# Patient Record
Sex: FEMALE
[De-identification: names, ages, dates, MRNs, and addresses within clinical notes are randomized per-mention and may not be internally consistent; named-entity substitution may affect disease eponyms.]

---

## 2017-06-08 PROBLEM — Z00.00 ENCOUNTER FOR PREVENTIVE HEALTH EXAMINATION: Status: ACTIVE | Noted: 2017-06-08

## 2017-06-30 ENCOUNTER — APPOINTMENT (OUTPATIENT)
Dept: ULTRASOUND IMAGING | Facility: IMAGING CENTER | Age: 32
End: 2017-06-30

## 2017-06-30 ENCOUNTER — OUTPATIENT (OUTPATIENT)
Dept: OUTPATIENT SERVICES | Facility: HOSPITAL | Age: 32
LOS: 1 days | End: 2017-06-30
Payer: COMMERCIAL

## 2017-06-30 DIAGNOSIS — Z00.8 ENCOUNTER FOR OTHER GENERAL EXAMINATION: ICD-10-CM

## 2017-06-30 PROCEDURE — 76641 ULTRASOUND BREAST COMPLETE: CPT

## 2017-08-14 ENCOUNTER — TRANSCRIPTION ENCOUNTER (OUTPATIENT)
Age: 32
End: 2017-08-14

## 2017-08-17 ENCOUNTER — TRANSCRIPTION ENCOUNTER (OUTPATIENT)
Age: 32
End: 2017-08-17

## 2017-08-21 ENCOUNTER — TRANSCRIPTION ENCOUNTER (OUTPATIENT)
Age: 32
End: 2017-08-21

## 2017-12-18 ENCOUNTER — RESULT REVIEW (OUTPATIENT)
Age: 32
End: 2017-12-18

## 2018-12-21 ENCOUNTER — EMERGENCY (EMERGENCY)
Facility: HOSPITAL | Age: 33
LOS: 1 days | Discharge: HOME | End: 2018-12-21
Attending: EMERGENCY MEDICINE
Payer: COMMERCIAL

## 2018-12-21 VITALS
SYSTOLIC BLOOD PRESSURE: 112 MMHG | WEIGHT: 139.99 LBS | TEMPERATURE: 98 F | RESPIRATION RATE: 18 BRPM | HEART RATE: 89 BPM | HEIGHT: 66 IN | DIASTOLIC BLOOD PRESSURE: 74 MMHG

## 2018-12-21 PROCEDURE — 99282 EMERGENCY DEPT VISIT SF MDM: CPT | Mod: 25

## 2018-12-21 PROCEDURE — 10060 I&D ABSCESS SIMPLE/SINGLE: CPT | Mod: LT

## 2018-12-21 PROCEDURE — 10060 I&D ABSCESS SIMPLE/SINGLE: CPT

## 2018-12-21 NOTE — ED PROVIDER NOTE - NSFOLLOWUPINSTRUCTIONS_ED_ALL_ED_FT
Follow up with your primary care doctor or dermatologist within 2 days for re-evaluate for need for re-drainage, return to the Emergency Department if they are not able to see you.  If you start running high fevers, have a large area of redness surrounding the abscess this could mean a spreading infection for which you should return to the Emergency Department.  Change the bandage over the abscess daily or as it becomes dirty.

## 2018-12-21 NOTE — ED PROVIDER NOTE - PHYSICAL EXAMINATION
Skin:  approx 1cm long by 0.25 cm wide superficial abscess with minimal surround cellulitis approx 1 cm proximal to L clavicle on neck

## 2018-12-21 NOTE — ED ADULT NURSE NOTE - OBJECTIVE STATEMENT
33 year old female presents ambulatory to ED through waiting room complaining of abscess to left side of neck, sent by dermatologist to be drained. Denies fevers, chills.

## 2018-12-21 NOTE — ED PROVIDER NOTE - OBJECTIVE STATEMENT
Patient presenting for evaluation of abscess on neck.  No fevers, no chills, some small drainage from site.  Otherwise healthy, no complaints.

## 2018-12-27 ENCOUNTER — RESULT REVIEW (OUTPATIENT)
Age: 33
End: 2018-12-27

## 2019-01-31 ENCOUNTER — APPOINTMENT (OUTPATIENT)
Dept: OBGYN | Facility: CLINIC | Age: 34
End: 2019-01-31
Payer: COMMERCIAL

## 2019-01-31 ENCOUNTER — NON-APPOINTMENT (OUTPATIENT)
Age: 34
End: 2019-01-31

## 2019-01-31 VITALS
SYSTOLIC BLOOD PRESSURE: 100 MMHG | DIASTOLIC BLOOD PRESSURE: 60 MMHG | HEIGHT: 67 IN | WEIGHT: 141.5 LBS | BODY MASS INDEX: 22.21 KG/M2

## 2019-01-31 PROCEDURE — 99203 OFFICE O/P NEW LOW 30 MIN: CPT

## 2019-02-04 LAB
ABO + RH PNL BLD: NORMAL
B19V IGG SER QL IA: 0.1 INDEX
B19V IGG+IGM SER-IMP: NEGATIVE
B19V IGG+IGM SER-IMP: NORMAL
B19V IGM FLD-ACNC: 0.2 INDEX
B19V IGM SER-ACNC: NEGATIVE
BACTERIA UR CULT: NORMAL
BASOPHILS # BLD AUTO: 0.02 K/UL
BASOPHILS NFR BLD AUTO: 0.3 %
BLD GP AB SCN SERPL QL: NORMAL
C TRACH RRNA SPEC QL NAA+PROBE: NOT DETECTED
CMV IGG SERPL QL: <0.2 U/ML
CMV IGG SERPL-IMP: NEGATIVE
CMV IGM SERPL QL: <8 AU/ML
CMV IGM SERPL QL: NEGATIVE
EOSINOPHIL # BLD AUTO: 0.05 K/UL
EOSINOPHIL NFR BLD AUTO: 0.7 %
HBA1C MFR BLD HPLC: 4.8 %
HBV SURFACE AG SER QL: NONREACTIVE
HCT VFR BLD CALC: 40.3 %
HCV AB SER QL: NONREACTIVE
HCV S/CO RATIO: 0.18 S/CO
HGB A MFR BLD: 97 %
HGB A2 MFR BLD: 2.6 %
HGB BLD-MCNC: 13.3 G/DL
HGB F MFR BLD: 0.4 %
HGB FRACT BLD-IMP: NORMAL
HIV1+2 AB SPEC QL IA.RAPID: NONREACTIVE
IMM GRANULOCYTES NFR BLD AUTO: 0.1 %
LEAD BLD-MCNC: <1 UG/DL
LYMPHOCYTES # BLD AUTO: 1.56 K/UL
LYMPHOCYTES NFR BLD AUTO: 21.9 %
MAN DIFF?: NORMAL
MCHC RBC-ENTMCNC: 31.5 PG
MCHC RBC-ENTMCNC: 33 GM/DL
MCV RBC AUTO: 95.5 FL
MONOCYTES # BLD AUTO: 0.49 K/UL
MONOCYTES NFR BLD AUTO: 6.9 %
N GONORRHOEA RRNA SPEC QL NAA+PROBE: NOT DETECTED
NEUTROPHILS # BLD AUTO: 4.98 K/UL
NEUTROPHILS NFR BLD AUTO: 70.1 %
PLATELET # BLD AUTO: 254 K/UL
RBC # BLD: 4.22 M/UL
RBC # FLD: 12.2 %
RUBV IGG FLD-ACNC: 11.7 INDEX
RUBV IGG SER-IMP: POSITIVE
SOURCE AMPLIFICATION: NORMAL
T PALLIDUM AB SER QL IA: NEGATIVE
TSH SERPL-ACNC: 0.66 UIU/ML
VZV AB TITR SER: POSITIVE
VZV IGG SER IF-ACNC: 846.6 INDEX
WBC # FLD AUTO: 7.11 K/UL

## 2019-02-09 LAB — FMR1 GENE MUT ANL BLD/T: NORMAL

## 2019-02-11 ENCOUNTER — OTHER (OUTPATIENT)
Age: 34
End: 2019-02-11

## 2019-02-11 ENCOUNTER — TRANSCRIPTION ENCOUNTER (OUTPATIENT)
Age: 34
End: 2019-02-11

## 2019-02-14 LAB — CFTR MUT TESTED BLD/T: NEGATIVE

## 2019-02-15 LAB
AR GENE MUT ANL BLD/T: NEGATIVE
GENE DIS ANL CARRIER INTERP-IMP: NEGATIVE

## 2019-02-25 ENCOUNTER — RESULT REVIEW (OUTPATIENT)
Age: 34
End: 2019-02-25

## 2019-02-25 LAB
CLARIM 15Q11.2: NORMAL
CLARIM 1P36: NORMAL
CLARIM 22Q11.2: NORMAL
CLARIM 4P-/WOLF-HIRSCHHORN: NORMAL
CLARIM 5P-/CRI DU CHAT: NORMAL
CLARIM ADDITIONAL INFO: NORMAL
CLARIM CHROMOSOME 13: NORMAL
CLARIM CHROMOSOME 18: NORMAL
CLARIM CHROMOSOME 21: NORMAL
CLARIM SEX CHROMOSOMES: NORMAL
CLARITEST NIPT W/MICRO: NORMAL

## 2019-02-27 ENCOUNTER — LABORATORY RESULT (OUTPATIENT)
Age: 34
End: 2019-02-27

## 2019-02-27 ENCOUNTER — APPOINTMENT (OUTPATIENT)
Dept: ANTEPARTUM | Facility: CLINIC | Age: 34
End: 2019-02-27
Payer: COMMERCIAL

## 2019-02-27 ENCOUNTER — ASOB RESULT (OUTPATIENT)
Age: 34
End: 2019-02-27

## 2019-02-27 PROCEDURE — 76813 OB US NUCHAL MEAS 1 GEST: CPT

## 2019-02-27 PROCEDURE — 36416 COLLJ CAPILLARY BLOOD SPEC: CPT

## 2019-02-27 PROCEDURE — 76801 OB US < 14 WKS SINGLE FETUS: CPT

## 2019-03-01 ENCOUNTER — TRANSCRIPTION ENCOUNTER (OUTPATIENT)
Age: 34
End: 2019-03-01

## 2019-03-07 ENCOUNTER — CHART COPY (OUTPATIENT)
Age: 34
End: 2019-03-07

## 2019-03-08 ENCOUNTER — NON-APPOINTMENT (OUTPATIENT)
Age: 34
End: 2019-03-08

## 2019-03-08 ENCOUNTER — APPOINTMENT (OUTPATIENT)
Dept: OBGYN | Facility: CLINIC | Age: 34
End: 2019-03-08
Payer: COMMERCIAL

## 2019-03-08 VITALS
SYSTOLIC BLOOD PRESSURE: 118 MMHG | WEIGHT: 144 LBS | HEIGHT: 67 IN | BODY MASS INDEX: 22.6 KG/M2 | DIASTOLIC BLOOD PRESSURE: 81 MMHG

## 2019-03-08 PROCEDURE — 0502F SUBSEQUENT PRENATAL CARE: CPT

## 2019-03-29 ENCOUNTER — APPOINTMENT (OUTPATIENT)
Dept: OBGYN | Facility: CLINIC | Age: 34
End: 2019-03-29

## 2019-03-29 VITALS
BODY MASS INDEX: 21.52 KG/M2 | HEIGHT: 67 IN | DIASTOLIC BLOOD PRESSURE: 71 MMHG | SYSTOLIC BLOOD PRESSURE: 111 MMHG | WEIGHT: 137.13 LBS

## 2019-03-29 VITALS
HEIGHT: 67 IN | WEIGHT: 144.38 LBS | DIASTOLIC BLOOD PRESSURE: 75 MMHG | SYSTOLIC BLOOD PRESSURE: 118 MMHG | BODY MASS INDEX: 22.66 KG/M2

## 2019-04-05 LAB
1ST TRIMESTER DATA: NORMAL
2ND TRIMESTER DATA: NORMAL
AFP PNL SERPL: NORMAL
AFP SERPL-ACNC: NORMAL
AFP SERPL-ACNC: NORMAL
B-HCG FREE SERPL-MCNC: NORMAL
CLINICAL BIOCHEMIST REVIEW: NORMAL
FREE BETA HCG 1ST TRIMESTER: NORMAL
INHIBIN A SERPL-MCNC: NORMAL
NASAL BONE: PRESENT
NOTES NTD: NORMAL
NT: NORMAL
PAPP-A SERPL-ACNC: NORMAL
U ESTRIOL SERPL-SCNC: NORMAL

## 2019-04-23 ENCOUNTER — ASOB RESULT (OUTPATIENT)
Age: 34
End: 2019-04-23

## 2019-04-23 ENCOUNTER — APPOINTMENT (OUTPATIENT)
Dept: ANTEPARTUM | Facility: CLINIC | Age: 34
End: 2019-04-23
Payer: COMMERCIAL

## 2019-04-23 PROCEDURE — 76811 OB US DETAILED SNGL FETUS: CPT

## 2019-04-23 PROCEDURE — 76817 TRANSVAGINAL US OBSTETRIC: CPT

## 2019-04-25 ENCOUNTER — APPOINTMENT (OUTPATIENT)
Dept: ANTEPARTUM | Facility: CLINIC | Age: 34
End: 2019-04-25

## 2019-04-30 ENCOUNTER — NON-APPOINTMENT (OUTPATIENT)
Age: 34
End: 2019-04-30

## 2019-04-30 ENCOUNTER — APPOINTMENT (OUTPATIENT)
Dept: OBGYN | Facility: CLINIC | Age: 34
End: 2019-04-30

## 2019-04-30 VITALS
DIASTOLIC BLOOD PRESSURE: 60 MMHG | SYSTOLIC BLOOD PRESSURE: 100 MMHG | WEIGHT: 149 LBS | HEIGHT: 67 IN | BODY MASS INDEX: 23.39 KG/M2

## 2019-04-30 DIAGNOSIS — Z34.01 ENCOUNTER FOR SUPERVISION OF NORMAL FIRST PREGNANCY, FIRST TRIMESTER: ICD-10-CM

## 2019-05-31 ENCOUNTER — NON-APPOINTMENT (OUTPATIENT)
Age: 34
End: 2019-05-31

## 2019-05-31 ENCOUNTER — APPOINTMENT (OUTPATIENT)
Dept: OBGYN | Facility: CLINIC | Age: 34
End: 2019-05-31
Payer: COMMERCIAL

## 2019-05-31 VITALS
SYSTOLIC BLOOD PRESSURE: 120 MMHG | BODY MASS INDEX: 23.26 KG/M2 | WEIGHT: 148.2 LBS | DIASTOLIC BLOOD PRESSURE: 72 MMHG | HEIGHT: 67 IN

## 2019-05-31 LAB
BILIRUB UR QL STRIP: NORMAL
CLARITY UR: CLEAR
COLLECTION METHOD: NORMAL
GLUCOSE UR-MCNC: NORMAL
HCG UR QL: 1 EU/DL
HGB UR QL STRIP.AUTO: NORMAL
KETONES UR-MCNC: NORMAL
LEUKOCYTE ESTERASE UR QL STRIP: NORMAL
NITRITE UR QL STRIP: NORMAL
PH UR STRIP: 7.5
PROT UR STRIP-MCNC: NORMAL
SP GR UR STRIP: 1.01

## 2019-05-31 PROCEDURE — 0502F SUBSEQUENT PRENATAL CARE: CPT

## 2019-06-10 ENCOUNTER — NON-APPOINTMENT (OUTPATIENT)
Age: 34
End: 2019-06-10

## 2019-06-10 LAB
BASOPHILS # BLD AUTO: 0.02 K/UL
BASOPHILS NFR BLD AUTO: 0.2 %
EOSINOPHIL # BLD AUTO: 0.02 K/UL
EOSINOPHIL NFR BLD AUTO: 0.2 %
GLUCOSE 1H P 50 G GLC PO SERPL-MCNC: 74 MG/DL
HCT VFR BLD CALC: 39.2 %
HGB BLD-MCNC: 12.5 G/DL
HIV1+2 AB SPEC QL IA.RAPID: NONREACTIVE
IMM GRANULOCYTES NFR BLD AUTO: 0.4 %
LYMPHOCYTES # BLD AUTO: 1.54 K/UL
LYMPHOCYTES NFR BLD AUTO: 16.7 %
MAN DIFF?: NORMAL
MCHC RBC-ENTMCNC: 31.9 GM/DL
MCHC RBC-ENTMCNC: 32 PG
MCV RBC AUTO: 100.3 FL
MEV IGG FLD QL IA: 224 AU/ML
MEV IGG+IGM SER-IMP: POSITIVE
MEV IGM SER QL: NEGATIVE
MONOCYTES # BLD AUTO: 0.55 K/UL
MONOCYTES NFR BLD AUTO: 6 %
NEUTROPHILS # BLD AUTO: 7.06 K/UL
NEUTROPHILS NFR BLD AUTO: 76.5 %
PLATELET # BLD AUTO: 231 K/UL
RBC # BLD: 3.91 M/UL
RBC # FLD: 13 %
WBC # FLD AUTO: 9.23 K/UL

## 2019-06-11 ENCOUNTER — APPOINTMENT (OUTPATIENT)
Dept: OBGYN | Facility: CLINIC | Age: 34
End: 2019-06-11

## 2019-06-13 ENCOUNTER — APPOINTMENT (OUTPATIENT)
Dept: OBGYN | Facility: CLINIC | Age: 34
End: 2019-06-13
Payer: COMMERCIAL

## 2019-06-13 VITALS
WEIGHT: 153 LBS | DIASTOLIC BLOOD PRESSURE: 72 MMHG | SYSTOLIC BLOOD PRESSURE: 110 MMHG | BODY MASS INDEX: 24.01 KG/M2 | HEIGHT: 67 IN

## 2019-06-13 PROCEDURE — 0502F SUBSEQUENT PRENATAL CARE: CPT

## 2019-07-02 ENCOUNTER — APPOINTMENT (OUTPATIENT)
Dept: OBGYN | Facility: CLINIC | Age: 34
End: 2019-07-02
Payer: COMMERCIAL

## 2019-07-02 ENCOUNTER — NON-APPOINTMENT (OUTPATIENT)
Age: 34
End: 2019-07-02

## 2019-07-02 VITALS
BODY MASS INDEX: 24.33 KG/M2 | WEIGHT: 155 LBS | HEIGHT: 67 IN | SYSTOLIC BLOOD PRESSURE: 111 MMHG | DIASTOLIC BLOOD PRESSURE: 74 MMHG

## 2019-07-02 PROCEDURE — 0502F SUBSEQUENT PRENATAL CARE: CPT

## 2019-07-13 ENCOUNTER — NON-APPOINTMENT (OUTPATIENT)
Age: 34
End: 2019-07-13

## 2019-07-23 ENCOUNTER — NON-APPOINTMENT (OUTPATIENT)
Age: 34
End: 2019-07-23

## 2019-07-23 ENCOUNTER — APPOINTMENT (OUTPATIENT)
Dept: OBGYN | Facility: CLINIC | Age: 34
End: 2019-07-23
Payer: COMMERCIAL

## 2019-07-23 VITALS
SYSTOLIC BLOOD PRESSURE: 120 MMHG | HEIGHT: 67 IN | DIASTOLIC BLOOD PRESSURE: 65 MMHG | WEIGHT: 158.25 LBS | BODY MASS INDEX: 24.84 KG/M2

## 2019-07-23 PROCEDURE — 0502F SUBSEQUENT PRENATAL CARE: CPT

## 2019-08-08 ENCOUNTER — APPOINTMENT (OUTPATIENT)
Dept: OBGYN | Facility: CLINIC | Age: 34
End: 2019-08-08
Payer: COMMERCIAL

## 2019-08-08 VITALS — BODY MASS INDEX: 25.06 KG/M2 | DIASTOLIC BLOOD PRESSURE: 70 MMHG | WEIGHT: 160 LBS | SYSTOLIC BLOOD PRESSURE: 104 MMHG

## 2019-08-08 DIAGNOSIS — Z34.93 ENCOUNTER FOR SUPERVISION OF NORMAL PREGNANCY, UNSPECIFIED, THIRD TRIMESTER: ICD-10-CM

## 2019-08-08 PROCEDURE — 0502F SUBSEQUENT PRENATAL CARE: CPT

## 2019-08-12 LAB
GP B STREP DNA SPEC QL NAA+PROBE: NORMAL
GP B STREP DNA SPEC QL NAA+PROBE: NOT DETECTED
SOURCE GBS: NORMAL

## 2019-08-13 ENCOUNTER — ASOB RESULT (OUTPATIENT)
Age: 34
End: 2019-08-13

## 2019-08-13 ENCOUNTER — APPOINTMENT (OUTPATIENT)
Dept: ANTEPARTUM | Facility: CLINIC | Age: 34
End: 2019-08-13
Payer: COMMERCIAL

## 2019-08-13 PROCEDURE — 76816 OB US FOLLOW-UP PER FETUS: CPT

## 2019-08-16 ENCOUNTER — APPOINTMENT (OUTPATIENT)
Dept: OBGYN | Facility: CLINIC | Age: 34
End: 2019-08-16
Payer: COMMERCIAL

## 2019-08-16 VITALS — HEIGHT: 67 IN | DIASTOLIC BLOOD PRESSURE: 82 MMHG | SYSTOLIC BLOOD PRESSURE: 121 MMHG

## 2019-08-16 PROCEDURE — 99080 SPECIAL REPORTS OR FORMS: CPT

## 2019-08-16 PROCEDURE — 0502F SUBSEQUENT PRENATAL CARE: CPT

## 2019-08-23 ENCOUNTER — APPOINTMENT (OUTPATIENT)
Dept: PEDIATRICS | Facility: CLINIC | Age: 34
End: 2019-08-23
Payer: COMMERCIAL

## 2019-08-23 ENCOUNTER — NON-APPOINTMENT (OUTPATIENT)
Age: 34
End: 2019-08-23

## 2019-08-23 ENCOUNTER — APPOINTMENT (OUTPATIENT)
Dept: OBGYN | Facility: CLINIC | Age: 34
End: 2019-08-23

## 2019-08-23 ENCOUNTER — APPOINTMENT (OUTPATIENT)
Dept: OBGYN | Facility: CLINIC | Age: 34
End: 2019-08-23
Payer: COMMERCIAL

## 2019-08-23 VITALS
SYSTOLIC BLOOD PRESSURE: 100 MMHG | BODY MASS INDEX: 25.58 KG/M2 | WEIGHT: 163 LBS | HEIGHT: 67 IN | DIASTOLIC BLOOD PRESSURE: 50 MMHG

## 2019-08-23 PROCEDURE — 0502F SUBSEQUENT PRENATAL CARE: CPT

## 2019-08-23 PROCEDURE — 99203 OFFICE O/P NEW LOW 30 MIN: CPT

## 2019-08-30 ENCOUNTER — NON-APPOINTMENT (OUTPATIENT)
Age: 34
End: 2019-08-30

## 2019-08-30 ENCOUNTER — APPOINTMENT (OUTPATIENT)
Dept: OBGYN | Facility: CLINIC | Age: 34
End: 2019-08-30
Payer: COMMERCIAL

## 2019-08-30 VITALS
BODY MASS INDEX: 26.06 KG/M2 | SYSTOLIC BLOOD PRESSURE: 108 MMHG | HEIGHT: 67 IN | WEIGHT: 166 LBS | DIASTOLIC BLOOD PRESSURE: 70 MMHG

## 2019-08-30 DIAGNOSIS — Z34.03 ENCOUNTER FOR SUPERVISION OF NORMAL FIRST PREGNANCY, THIRD TRIMESTER: ICD-10-CM

## 2019-08-30 PROCEDURE — 0502F SUBSEQUENT PRENATAL CARE: CPT

## 2019-09-05 ENCOUNTER — APPOINTMENT (OUTPATIENT)
Dept: OBGYN | Facility: CLINIC | Age: 34
End: 2019-09-05
Payer: COMMERCIAL

## 2019-09-05 ENCOUNTER — NON-APPOINTMENT (OUTPATIENT)
Age: 34
End: 2019-09-05

## 2019-09-05 VITALS
DIASTOLIC BLOOD PRESSURE: 80 MMHG | HEIGHT: 67 IN | WEIGHT: 167 LBS | SYSTOLIC BLOOD PRESSURE: 124 MMHG | BODY MASS INDEX: 26.21 KG/M2

## 2019-09-05 PROCEDURE — 0502F SUBSEQUENT PRENATAL CARE: CPT

## 2019-09-07 ENCOUNTER — INPATIENT (INPATIENT)
Facility: HOSPITAL | Age: 34
LOS: 1 days | Discharge: HOME | End: 2019-09-09
Attending: OBSTETRICS & GYNECOLOGY | Admitting: OBSTETRICS & GYNECOLOGY
Payer: COMMERCIAL

## 2019-09-07 VITALS
OXYGEN SATURATION: 100 % | SYSTOLIC BLOOD PRESSURE: 128 MMHG | HEART RATE: 100 BPM | TEMPERATURE: 98 F | RESPIRATION RATE: 18 BRPM | DIASTOLIC BLOOD PRESSURE: 63 MMHG

## 2019-09-07 DIAGNOSIS — O26.899 OTHER SPECIFIED PREGNANCY RELATED CONDITIONS, UNSPECIFIED TRIMESTER: ICD-10-CM

## 2019-09-07 DIAGNOSIS — Z3A.00 WEEKS OF GESTATION OF PREGNANCY NOT SPECIFIED: ICD-10-CM

## 2019-09-07 DIAGNOSIS — Z34.80 ENCOUNTER FOR SUPERVISION OF OTHER NORMAL PREGNANCY, UNSPECIFIED TRIMESTER: ICD-10-CM

## 2019-09-07 LAB
ALBUMIN SERPL ELPH-MCNC: 3.5 G/DL — SIGNIFICANT CHANGE UP (ref 3.3–5)
ALP SERPL-CCNC: 111 U/L — SIGNIFICANT CHANGE UP (ref 40–120)
ALT FLD-CCNC: 17 U/L — SIGNIFICANT CHANGE UP (ref 10–45)
ANION GAP SERPL CALC-SCNC: 11 MMOL/L — SIGNIFICANT CHANGE UP (ref 5–17)
APPEARANCE UR: ABNORMAL
APTT BLD: 26.8 SEC — LOW (ref 27.5–36.3)
AST SERPL-CCNC: 19 U/L — SIGNIFICANT CHANGE UP (ref 10–40)
BACTERIA # UR AUTO: NEGATIVE — SIGNIFICANT CHANGE UP
BASOPHILS # BLD AUTO: 0.1 K/UL — SIGNIFICANT CHANGE UP (ref 0–0.2)
BASOPHILS # BLD AUTO: 0.1 K/UL — SIGNIFICANT CHANGE UP (ref 0–0.2)
BASOPHILS NFR BLD AUTO: 0.4 % — SIGNIFICANT CHANGE UP (ref 0–2)
BASOPHILS NFR BLD AUTO: 0.5 % — SIGNIFICANT CHANGE UP (ref 0–2)
BILIRUB SERPL-MCNC: 0.8 MG/DL — SIGNIFICANT CHANGE UP (ref 0.2–1.2)
BILIRUB UR-MCNC: NEGATIVE — SIGNIFICANT CHANGE UP
BLD GP AB SCN SERPL QL: NEGATIVE — SIGNIFICANT CHANGE UP
BUN SERPL-MCNC: 8 MG/DL — SIGNIFICANT CHANGE UP (ref 7–23)
CALCIUM SERPL-MCNC: 9.6 MG/DL — SIGNIFICANT CHANGE UP (ref 8.4–10.5)
CHLORIDE SERPL-SCNC: 104 MMOL/L — SIGNIFICANT CHANGE UP (ref 96–108)
CO2 SERPL-SCNC: 25 MMOL/L — SIGNIFICANT CHANGE UP (ref 22–31)
COLOR SPEC: SIGNIFICANT CHANGE UP
CREAT ?TM UR-MCNC: 42 MG/DL — SIGNIFICANT CHANGE UP
CREAT SERPL-MCNC: 0.63 MG/DL — SIGNIFICANT CHANGE UP (ref 0.5–1.3)
DIFF PNL FLD: ABNORMAL
EOSINOPHIL # BLD AUTO: 0.1 K/UL — SIGNIFICANT CHANGE UP (ref 0–0.5)
EOSINOPHIL # BLD AUTO: 0.1 K/UL — SIGNIFICANT CHANGE UP (ref 0–0.5)
EOSINOPHIL NFR BLD AUTO: 0.5 % — SIGNIFICANT CHANGE UP (ref 0–6)
EOSINOPHIL NFR BLD AUTO: 0.6 % — SIGNIFICANT CHANGE UP (ref 0–6)
EPI CELLS # UR: 2 /HPF — SIGNIFICANT CHANGE UP (ref 0–5)
FIBRINOGEN PPP-MCNC: 614 MG/DL — HIGH (ref 350–510)
GLUCOSE SERPL-MCNC: 82 MG/DL — SIGNIFICANT CHANGE UP (ref 70–99)
GLUCOSE UR QL: NEGATIVE — SIGNIFICANT CHANGE UP
HCT VFR BLD CALC: 37.6 % — SIGNIFICANT CHANGE UP (ref 34.5–45)
HCT VFR BLD CALC: 37.8 % — SIGNIFICANT CHANGE UP (ref 34.5–45)
HGB BLD-MCNC: 12.5 G/DL — SIGNIFICANT CHANGE UP (ref 11.5–15.5)
HGB BLD-MCNC: 12.5 G/DL — SIGNIFICANT CHANGE UP (ref 11.5–15.5)
HYALINE CASTS # UR AUTO: 10 /LPF — HIGH (ref 0–7)
INR BLD: 0.97 RATIO — SIGNIFICANT CHANGE UP (ref 0.88–1.16)
KETONES UR-MCNC: NEGATIVE — SIGNIFICANT CHANGE UP
LDH SERPL L TO P-CCNC: 217 U/L — SIGNIFICANT CHANGE UP (ref 50–242)
LEUKOCYTE ESTERASE UR-ACNC: NEGATIVE — SIGNIFICANT CHANGE UP
LYMPHOCYTES # BLD AUTO: 16.4 % — SIGNIFICANT CHANGE UP (ref 13–44)
LYMPHOCYTES # BLD AUTO: 17.1 % — SIGNIFICANT CHANGE UP (ref 13–44)
LYMPHOCYTES # BLD AUTO: 2.2 K/UL — SIGNIFICANT CHANGE UP (ref 1–3.3)
LYMPHOCYTES # BLD AUTO: 2.2 K/UL — SIGNIFICANT CHANGE UP (ref 1–3.3)
MCHC RBC-ENTMCNC: 32.2 PG — SIGNIFICANT CHANGE UP (ref 27–34)
MCHC RBC-ENTMCNC: 32.7 PG — SIGNIFICANT CHANGE UP (ref 27–34)
MCHC RBC-ENTMCNC: 33 GM/DL — SIGNIFICANT CHANGE UP (ref 32–36)
MCHC RBC-ENTMCNC: 33.2 GM/DL — SIGNIFICANT CHANGE UP (ref 32–36)
MCV RBC AUTO: 97.7 FL — SIGNIFICANT CHANGE UP (ref 80–100)
MCV RBC AUTO: 98.5 FL — SIGNIFICANT CHANGE UP (ref 80–100)
MONOCYTES # BLD AUTO: 0.7 K/UL — SIGNIFICANT CHANGE UP (ref 0–0.9)
MONOCYTES # BLD AUTO: 0.9 K/UL — SIGNIFICANT CHANGE UP (ref 0–0.9)
MONOCYTES NFR BLD AUTO: 5.8 % — SIGNIFICANT CHANGE UP (ref 2–14)
MONOCYTES NFR BLD AUTO: 6.9 % — SIGNIFICANT CHANGE UP (ref 2–14)
NEUTROPHILS # BLD AUTO: 10.4 K/UL — HIGH (ref 1.8–7.4)
NEUTROPHILS # BLD AUTO: 9.8 K/UL — HIGH (ref 1.8–7.4)
NEUTROPHILS NFR BLD AUTO: 75.9 % — SIGNIFICANT CHANGE UP (ref 43–77)
NEUTROPHILS NFR BLD AUTO: 76.1 % — SIGNIFICANT CHANGE UP (ref 43–77)
NITRITE UR-MCNC: NEGATIVE — SIGNIFICANT CHANGE UP
PH UR: 7.5 — SIGNIFICANT CHANGE UP (ref 5–8)
PLATELET # BLD AUTO: 210 K/UL — SIGNIFICANT CHANGE UP (ref 150–400)
PLATELET # BLD AUTO: 232 K/UL — SIGNIFICANT CHANGE UP (ref 150–400)
POTASSIUM SERPL-MCNC: 4.4 MMOL/L — SIGNIFICANT CHANGE UP (ref 3.5–5.3)
POTASSIUM SERPL-SCNC: 4.4 MMOL/L — SIGNIFICANT CHANGE UP (ref 3.5–5.3)
PROT ?TM UR-MCNC: 41 MG/DL — HIGH (ref 0–12)
PROT SERPL-MCNC: 6.4 G/DL — SIGNIFICANT CHANGE UP (ref 6–8.3)
PROT UR-MCNC: ABNORMAL
PROT/CREAT UR-RTO: 1 RATIO — HIGH (ref 0–0.2)
PROTHROM AB SERPL-ACNC: 11 SEC — SIGNIFICANT CHANGE UP (ref 10–12.9)
RBC # BLD: 3.82 M/UL — SIGNIFICANT CHANGE UP (ref 3.8–5.2)
RBC # BLD: 3.87 M/UL — SIGNIFICANT CHANGE UP (ref 3.8–5.2)
RBC # FLD: 11.2 % — SIGNIFICANT CHANGE UP (ref 10.3–14.5)
RBC # FLD: 11.3 % — SIGNIFICANT CHANGE UP (ref 10.3–14.5)
RBC CASTS # UR COMP ASSIST: 236 /HPF — HIGH (ref 0–4)
RH IG SCN BLD-IMP: POSITIVE — SIGNIFICANT CHANGE UP
RH IG SCN BLD-IMP: POSITIVE — SIGNIFICANT CHANGE UP
SODIUM SERPL-SCNC: 140 MMOL/L — SIGNIFICANT CHANGE UP (ref 135–145)
SP GR SPEC: 1.01 — SIGNIFICANT CHANGE UP (ref 1.01–1.02)
URATE SERPL-MCNC: 5 MG/DL — SIGNIFICANT CHANGE UP (ref 2.5–7)
UROBILINOGEN FLD QL: SIGNIFICANT CHANGE UP
WBC # BLD: 12.9 K/UL — HIGH (ref 3.8–10.5)
WBC # BLD: 13.8 K/UL — HIGH (ref 3.8–10.5)
WBC # FLD AUTO: 12.9 K/UL — HIGH (ref 3.8–10.5)
WBC # FLD AUTO: 13.8 K/UL — HIGH (ref 3.8–10.5)
WBC UR QL: 3 /HPF — SIGNIFICANT CHANGE UP (ref 0–5)

## 2019-09-07 PROCEDURE — 59400 OBSTETRICAL CARE: CPT

## 2019-09-07 RX ORDER — HYDROCORTISONE 1 %
1 OINTMENT (GRAM) TOPICAL EVERY 6 HOURS
Refills: 0 | Status: DISCONTINUED | OUTPATIENT
Start: 2019-09-07 | End: 2019-09-09

## 2019-09-07 RX ORDER — GLYCERIN ADULT
1 SUPPOSITORY, RECTAL RECTAL AT BEDTIME
Refills: 0 | Status: DISCONTINUED | OUTPATIENT
Start: 2019-09-07 | End: 2019-09-09

## 2019-09-07 RX ORDER — BENZOCAINE 10 %
1 GEL (GRAM) MUCOUS MEMBRANE EVERY 6 HOURS
Refills: 0 | Status: DISCONTINUED | OUTPATIENT
Start: 2019-09-07 | End: 2019-09-09

## 2019-09-07 RX ORDER — PRAMOXINE HYDROCHLORIDE 150 MG/15G
1 AEROSOL, FOAM RECTAL EVERY 4 HOURS
Refills: 0 | Status: DISCONTINUED | OUTPATIENT
Start: 2019-09-07 | End: 2019-09-09

## 2019-09-07 RX ORDER — SIMETHICONE 80 MG/1
80 TABLET, CHEWABLE ORAL EVERY 4 HOURS
Refills: 0 | Status: DISCONTINUED | OUTPATIENT
Start: 2019-09-07 | End: 2019-09-09

## 2019-09-07 RX ORDER — OXYCODONE HYDROCHLORIDE 5 MG/1
5 TABLET ORAL
Refills: 0 | Status: DISCONTINUED | OUTPATIENT
Start: 2019-09-07 | End: 2019-09-09

## 2019-09-07 RX ORDER — KETOROLAC TROMETHAMINE 30 MG/ML
30 SYRINGE (ML) INJECTION ONCE
Refills: 0 | Status: DISCONTINUED | OUTPATIENT
Start: 2019-09-07 | End: 2019-09-07

## 2019-09-07 RX ORDER — LANOLIN
1 OINTMENT (GRAM) TOPICAL EVERY 6 HOURS
Refills: 0 | Status: DISCONTINUED | OUTPATIENT
Start: 2019-09-07 | End: 2019-09-09

## 2019-09-07 RX ORDER — OXYTOCIN 10 UNIT/ML
333.33 VIAL (ML) INJECTION
Qty: 20 | Refills: 0 | Status: DISCONTINUED | OUTPATIENT
Start: 2019-09-07 | End: 2019-09-09

## 2019-09-07 RX ORDER — DOCUSATE SODIUM 100 MG
100 CAPSULE ORAL
Refills: 0 | Status: DISCONTINUED | OUTPATIENT
Start: 2019-09-07 | End: 2019-09-09

## 2019-09-07 RX ORDER — AER TRAVELER 0.5 G/1
1 SOLUTION RECTAL; TOPICAL EVERY 4 HOURS
Refills: 0 | Status: DISCONTINUED | OUTPATIENT
Start: 2019-09-07 | End: 2019-09-09

## 2019-09-07 RX ORDER — CITRIC ACID/SODIUM CITRATE 300-500 MG
15 SOLUTION, ORAL ORAL EVERY 6 HOURS
Refills: 0 | Status: DISCONTINUED | OUTPATIENT
Start: 2019-09-07 | End: 2019-09-08

## 2019-09-07 RX ORDER — TETANUS TOXOID, REDUCED DIPHTHERIA TOXOID AND ACELLULAR PERTUSSIS VACCINE, ADSORBED 5; 2.5; 8; 8; 2.5 [IU]/.5ML; [IU]/.5ML; UG/.5ML; UG/.5ML; UG/.5ML
0.5 SUSPENSION INTRAMUSCULAR ONCE
Refills: 0 | Status: DISCONTINUED | OUTPATIENT
Start: 2019-09-07 | End: 2019-09-09

## 2019-09-07 RX ORDER — DIBUCAINE 1 %
1 OINTMENT (GRAM) RECTAL EVERY 6 HOURS
Refills: 0 | Status: DISCONTINUED | OUTPATIENT
Start: 2019-09-07 | End: 2019-09-09

## 2019-09-07 RX ORDER — IBUPROFEN 200 MG
600 TABLET ORAL EVERY 6 HOURS
Refills: 0 | Status: COMPLETED | OUTPATIENT
Start: 2019-09-07 | End: 2020-08-05

## 2019-09-07 RX ORDER — DIPHENHYDRAMINE HCL 50 MG
25 CAPSULE ORAL EVERY 6 HOURS
Refills: 0 | Status: DISCONTINUED | OUTPATIENT
Start: 2019-09-07 | End: 2019-09-09

## 2019-09-07 RX ORDER — ACETAMINOPHEN 500 MG
975 TABLET ORAL
Refills: 0 | Status: DISCONTINUED | OUTPATIENT
Start: 2019-09-07 | End: 2019-09-09

## 2019-09-07 RX ORDER — SODIUM CHLORIDE 9 MG/ML
1000 INJECTION, SOLUTION INTRAVENOUS
Refills: 0 | Status: DISCONTINUED | OUTPATIENT
Start: 2019-09-07 | End: 2019-09-08

## 2019-09-07 RX ORDER — OXYTOCIN 10 UNIT/ML
2 VIAL (ML) INJECTION
Qty: 30 | Refills: 0 | Status: DISCONTINUED | OUTPATIENT
Start: 2019-09-07 | End: 2019-09-09

## 2019-09-07 RX ORDER — MAGNESIUM HYDROXIDE 400 MG/1
30 TABLET, CHEWABLE ORAL
Refills: 0 | Status: DISCONTINUED | OUTPATIENT
Start: 2019-09-07 | End: 2019-09-09

## 2019-09-07 RX ORDER — OXYCODONE HYDROCHLORIDE 5 MG/1
5 TABLET ORAL ONCE
Refills: 0 | Status: DISCONTINUED | OUTPATIENT
Start: 2019-09-07 | End: 2019-09-09

## 2019-09-07 RX ORDER — SODIUM CHLORIDE 9 MG/ML
3 INJECTION INTRAMUSCULAR; INTRAVENOUS; SUBCUTANEOUS EVERY 8 HOURS
Refills: 0 | Status: DISCONTINUED | OUTPATIENT
Start: 2019-09-07 | End: 2019-09-09

## 2019-09-07 RX ADMIN — Medication 2 MILLIUNIT(S)/MIN: at 12:15

## 2019-09-07 RX ADMIN — Medication 30 MILLIGRAM(S): at 23:46

## 2019-09-08 LAB
HCT VFR BLD CALC: 31.9 % — LOW (ref 34.5–45)
HGB BLD-MCNC: 10.7 G/DL — LOW (ref 11.5–15.5)
T PALLIDUM AB TITR SER: NEGATIVE — SIGNIFICANT CHANGE UP

## 2019-09-08 RX ORDER — IBUPROFEN 200 MG
600 TABLET ORAL EVERY 6 HOURS
Refills: 0 | Status: DISCONTINUED | OUTPATIENT
Start: 2019-09-08 | End: 2019-09-09

## 2019-09-08 RX ADMIN — Medication 975 MILLIGRAM(S): at 10:15

## 2019-09-08 RX ADMIN — Medication 600 MILLIGRAM(S): at 06:52

## 2019-09-08 RX ADMIN — Medication 600 MILLIGRAM(S): at 18:33

## 2019-09-08 RX ADMIN — Medication 600 MILLIGRAM(S): at 12:30

## 2019-09-08 RX ADMIN — Medication 600 MILLIGRAM(S): at 19:15

## 2019-09-08 RX ADMIN — Medication 975 MILLIGRAM(S): at 15:43

## 2019-09-08 RX ADMIN — Medication 600 MILLIGRAM(S): at 06:19

## 2019-09-08 RX ADMIN — Medication 975 MILLIGRAM(S): at 02:33

## 2019-09-08 RX ADMIN — Medication 1 TABLET(S): at 12:30

## 2019-09-08 RX ADMIN — Medication 600 MILLIGRAM(S): at 13:00

## 2019-09-08 RX ADMIN — Medication 975 MILLIGRAM(S): at 03:03

## 2019-09-08 RX ADMIN — Medication 975 MILLIGRAM(S): at 09:44

## 2019-09-08 RX ADMIN — SODIUM CHLORIDE 3 MILLILITER(S): 9 INJECTION INTRAMUSCULAR; INTRAVENOUS; SUBCUTANEOUS at 04:25

## 2019-09-08 RX ADMIN — Medication 975 MILLIGRAM(S): at 16:20

## 2019-09-08 NOTE — PROGRESS NOTE ADULT - SUBJECTIVE AND OBJECTIVE BOX
OB Progress Note:  PPD#1    S: 34yo PPD#1 s/p . Patient feels well. Pain is well controlled. She is tolerating a regular diet and passing flatus. She is voiding spontaneously, and ambulating without difficulty. Endorses light vaginal bleeding, soaking less than 1 pad/hour. Denies CP/SOB. Denies lightheadedness/dizziness. Denies N/V.     O:  Vitals:  Vital Signs Last 24 Hrs  T(C): 36.9 (08 Sep 2019 04:28), Max: 37.2 (08 Sep 2019 00:24)  T(F): 98.5 (08 Sep 2019 04:28), Max: 98.9 (08 Sep 2019 00:24)  HR: 69 (08 Sep 2019 04:) (69 - 100)  BP: 99/56 (08 Sep 2019 04:28) (99/56 - 133/67)  BP(mean): --  RR: 18 (08 Sep 2019 04:28) (16 - 18)  SpO2: 97% (08 Sep 2019 04:28) (96% - 100%)    MEDICATIONS  (STANDING):  acetaminophen   Tablet .. 975 milliGRAM(s) Oral <User Schedule>  diphtheria/tetanus/pertussis (acellular) Vaccine (ADAcel) 0.5 milliLiter(s) IntraMuscular once  ibuprofen  Tablet. 600 milliGRAM(s) Oral every 6 hours  oxytocin Infusion 333.333 milliUNIT(s)/Min (1000 mL/Hr) IV Continuous <Continuous>  oxytocin Infusion 333.333 milliUNIT(s)/Min (1000 mL/Hr) IV Continuous <Continuous>  oxytocin Infusion 2 milliUNIT(s)/Min (2 mL/Hr) IV Continuous <Continuous>  prenatal multivitamin 1 Tablet(s) Oral daily  sodium chloride 0.9% lock flush 3 milliLiter(s) IV Push every 8 hours      Labs:  Blood type: A Positive  Rubella IgG: RPR: Negative                          12.5   13.8<H> >-----------< 210    (  @ 18:10 )             37.6                        12.5   12.9<H> >-----------< 232    (  @ 06:37 )             37.8    19 @ 18:10      140  |  104  |  8   ----------------------------<  82  4.4   |  25  |  0.63        Ca    9.6      07 Sep 2019 18:10    TPro  6.4  /  Alb  3.5  /  TBili  0.8  /  DBili  x   /  AST  19  /  ALT  17  /  AlkPhos  111  19 @ 18:10          Physical Exam:  General: NAD  Heart: all extremities well perfused  Lungs: breathing comfortably  Abdomen: soft, non-tender, non-distended, fundus firm  Vaginal: lochia wnl  Extremities: No calf tenderness or erythema
 PPD#1- ATTENDING NOTE  YANELIS GOMEZ  MRN-[id number]  33y    Patient is a 33y old  Female s/P  PPD#1      S: Patient doing well. Minimal lochia. Pain controlled.no HA, no N/V, no RUQ pain     O: Vital Signs Last 24 Hrs  T(C): 36.8 (08 Sep 2019 09:25), Max: 37.2 (08 Sep 2019 00:24)  T(F): 98.3 (08 Sep 2019 09:25), Max: 98.9 (08 Sep 2019 00:24)  HR: 86 (08 Sep 2019 09:25) (69 - 100)  BP: 120/87 (08 Sep 2019 09:25) (99/56 - 133/67)  BP(mean): --  RR: 18 (08 Sep 2019 09:25) (16 - 18)  SpO2: 97% (08 Sep 2019 04:28) (96% - 100%)    Gen: NAD  Abd: soft, NT, ND, fundus firm below umbilicus  Lochia: moderate  Ext: no tenderness, no hyper reflexia    Labs:                        10.7   x     )-----------( x        ( 08 Sep 2019 08:50 )             31.9       A: 33y PPD#1 s/p  doing well.  Gestational Hypertension during labor  BP wellcontrolled, not on antihypertensives.     Plan:  Analgesia prn, Motrin and Tylenol  Regular diet  continue routine postpartum care     Julia Boateng MD  Office Number [phone]

## 2019-09-08 NOTE — PROGRESS NOTE ADULT - PROBLEM SELECTOR PLAN 1
- Pain well controlled, continue current pain regimen  - Continue ambulation, SCDs when not ambulating  - Continue regular diet  - f/u AM H/H    Enrike Ortega, PGY-1

## 2019-09-09 ENCOUNTER — TRANSCRIPTION ENCOUNTER (OUTPATIENT)
Age: 34
End: 2019-09-09

## 2019-09-09 ENCOUNTER — APPOINTMENT (OUTPATIENT)
Dept: ANTEPARTUM | Facility: CLINIC | Age: 34
End: 2019-09-09

## 2019-09-09 VITALS
DIASTOLIC BLOOD PRESSURE: 72 MMHG | HEART RATE: 72 BPM | SYSTOLIC BLOOD PRESSURE: 103 MMHG | TEMPERATURE: 98 F | RESPIRATION RATE: 18 BRPM | OXYGEN SATURATION: 98 %

## 2019-09-09 PROCEDURE — 83615 LACTATE (LD) (LDH) ENZYME: CPT

## 2019-09-09 PROCEDURE — 84550 ASSAY OF BLOOD/URIC ACID: CPT

## 2019-09-09 PROCEDURE — G0463: CPT

## 2019-09-09 PROCEDURE — 85610 PROTHROMBIN TIME: CPT

## 2019-09-09 PROCEDURE — 86780 TREPONEMA PALLIDUM: CPT

## 2019-09-09 PROCEDURE — 59025 FETAL NON-STRESS TEST: CPT

## 2019-09-09 PROCEDURE — 86900 BLOOD TYPING SEROLOGIC ABO: CPT

## 2019-09-09 PROCEDURE — 85730 THROMBOPLASTIN TIME PARTIAL: CPT

## 2019-09-09 PROCEDURE — 81001 URINALYSIS AUTO W/SCOPE: CPT

## 2019-09-09 PROCEDURE — 85014 HEMATOCRIT: CPT

## 2019-09-09 PROCEDURE — 80053 COMPREHEN METABOLIC PANEL: CPT

## 2019-09-09 PROCEDURE — 86901 BLOOD TYPING SEROLOGIC RH(D): CPT

## 2019-09-09 PROCEDURE — 85384 FIBRINOGEN ACTIVITY: CPT

## 2019-09-09 PROCEDURE — 85018 HEMOGLOBIN: CPT

## 2019-09-09 PROCEDURE — 85027 COMPLETE CBC AUTOMATED: CPT

## 2019-09-09 PROCEDURE — 86850 RBC ANTIBODY SCREEN: CPT

## 2019-09-09 PROCEDURE — 82570 ASSAY OF URINE CREATININE: CPT

## 2019-09-09 PROCEDURE — 59050 FETAL MONITOR W/REPORT: CPT

## 2019-09-09 PROCEDURE — 84156 ASSAY OF PROTEIN URINE: CPT

## 2019-09-09 RX ADMIN — Medication 600 MILLIGRAM(S): at 07:21

## 2019-09-09 RX ADMIN — Medication 600 MILLIGRAM(S): at 01:40

## 2019-09-09 RX ADMIN — Medication 600 MILLIGRAM(S): at 01:10

## 2019-09-09 RX ADMIN — Medication 600 MILLIGRAM(S): at 06:37

## 2019-09-09 NOTE — DISCHARGE NOTE OB - PATIENT PORTAL LINK FT
You can access the FollowMyHealth Patient Portal offered by [facility] by registering at the following website: [email or web address]. By joining [facility]’s FollowMyHealth portal, you will also be able to view your health information using other applications (apps) compatible with our system.

## 2019-09-09 NOTE — DISCHARGE NOTE OB - CARE PROVIDER_API CALL
Julia Boateng)  Obstetrics and Gynecology  [address], [address]  [place] [address]  Phone: [phone]  Fax: [phone]  Follow Up Time:

## 2019-09-09 NOTE — DISCHARGE NOTE OB - CARE PLAN
Principal Discharge DX:	 (normal spontaneous vaginal delivery)  Goal:	rest and recuperation  Assessment and plan of treatment:	office visit 2-3 weeks

## 2019-09-10 ENCOUNTER — APPOINTMENT (OUTPATIENT)
Dept: OBGYN | Facility: CLINIC | Age: 34
End: 2019-09-10

## 2019-10-18 ENCOUNTER — APPOINTMENT (OUTPATIENT)
Dept: OBGYN | Facility: CLINIC | Age: 34
End: 2019-10-18
Payer: COMMERCIAL

## 2019-10-18 VITALS
HEIGHT: 67 IN | DIASTOLIC BLOOD PRESSURE: 70 MMHG | SYSTOLIC BLOOD PRESSURE: 113 MMHG | BODY MASS INDEX: 22.6 KG/M2 | WEIGHT: 144 LBS

## 2019-10-18 PROCEDURE — 90471 IMMUNIZATION ADMIN: CPT

## 2019-10-18 PROCEDURE — 0503F POSTPARTUM CARE VISIT: CPT

## 2019-10-18 PROCEDURE — 90686 IIV4 VACC NO PRSV 0.5 ML IM: CPT

## 2019-10-18 RX ORDER — NORETHINDRONE ACETATE AND ETHINYL ESTRADIOL 1MG-20(21)
1-20 KIT ORAL
Qty: 90 | Refills: 3 | Status: ACTIVE | COMMUNITY
Start: 2019-10-18 | End: 1900-01-01

## 2019-11-07 ENCOUNTER — OTHER (OUTPATIENT)
Age: 34
End: 2019-11-07

## 2020-02-06 ENCOUNTER — TRANSCRIPTION ENCOUNTER (OUTPATIENT)
Age: 35
End: 2020-02-06

## 2020-04-26 ENCOUNTER — MESSAGE (OUTPATIENT)
Age: 35
End: 2020-04-26

## 2020-05-07 ENCOUNTER — APPOINTMENT (OUTPATIENT)
Dept: DISASTER EMERGENCY | Facility: CLINIC | Age: 35
End: 2020-05-07

## 2020-05-21 ENCOUNTER — APPOINTMENT (OUTPATIENT)
Dept: DISASTER EMERGENCY | Facility: CLINIC | Age: 35
End: 2020-05-21

## 2020-05-22 LAB
SARS-COV-2 IGG SERPL IA-ACNC: 0.1 INDEX
SARS-COV-2 IGG SERPL QL IA: NEGATIVE

## 2020-06-12 ENCOUNTER — TRANSCRIPTION ENCOUNTER (OUTPATIENT)
Age: 35
End: 2020-06-12

## 2020-07-30 RX ORDER — NORETHINDRONE 0.35 MG/1
0.35 TABLET ORAL
Qty: 28 | Refills: 1 | Status: ACTIVE | COMMUNITY
Start: 2019-11-07 | End: 1900-01-01

## 2020-09-04 ENCOUNTER — APPOINTMENT (OUTPATIENT)
Dept: OBGYN | Facility: CLINIC | Age: 35
End: 2020-09-04
Payer: COMMERCIAL

## 2020-09-04 VITALS
BODY MASS INDEX: 22.13 KG/M2 | SYSTOLIC BLOOD PRESSURE: 118 MMHG | HEIGHT: 67 IN | WEIGHT: 141 LBS | DIASTOLIC BLOOD PRESSURE: 70 MMHG

## 2020-09-04 DIAGNOSIS — Z01.419 ENCOUNTER FOR GYNECOLOGICAL EXAMINATION (GENERAL) (ROUTINE) W/OUT ABNORMAL FINDINGS: ICD-10-CM

## 2020-09-04 DIAGNOSIS — Z30.9 ENCOUNTER FOR CONTRACEPTIVE MANAGEMENT, UNSPECIFIED: ICD-10-CM

## 2020-09-04 PROCEDURE — 99395 PREV VISIT EST AGE 18-39: CPT

## 2020-09-04 RX ORDER — ETONOGESTREL AND ETHINYL ESTRADIOL 11.7; 2.7 MG/1; MG/1
0.12-0.015 INSERT, EXTENDED RELEASE VAGINAL
Qty: 3 | Refills: 3 | Status: ACTIVE | COMMUNITY
Start: 2020-09-04 | End: 1900-01-01

## 2020-09-04 RX ORDER — ETONOGESTREL AND ETHINYL ESTRADIOL 11.7; 2.7 MG/1; MG/1
0.12-0.015 INSERT, EXTENDED RELEASE VAGINAL
Qty: 1 | Refills: 0 | Status: ACTIVE | COMMUNITY
Start: 2020-09-04 | End: 1900-01-01

## 2020-09-10 LAB — CYTOLOGY CVX/VAG DOC THIN PREP: NORMAL

## 2020-11-17 ENCOUNTER — APPOINTMENT (OUTPATIENT)
Dept: HUMAN REPRODUCTION | Facility: CLINIC | Age: 35
End: 2020-11-17

## 2020-12-23 PROBLEM — Z01.419 ENCOUNTER FOR GYNECOLOGICAL EXAMINATION WITH PAPANICOLAOU SMEAR OF CERVIX: Status: RESOLVED | Noted: 2020-09-04 | Resolved: 2020-12-23

## 2021-04-22 ENCOUNTER — RX RENEWAL (OUTPATIENT)
Age: 36
End: 2021-04-22

## 2021-09-15 ENCOUNTER — TRANSCRIPTION ENCOUNTER (OUTPATIENT)
Age: 36
End: 2021-09-15

## 2021-09-28 ENCOUNTER — APPOINTMENT (OUTPATIENT)
Dept: OBGYN | Facility: CLINIC | Age: 36
End: 2021-09-28
Payer: COMMERCIAL

## 2021-09-28 VITALS
DIASTOLIC BLOOD PRESSURE: 70 MMHG | BODY MASS INDEX: 22.62 KG/M2 | SYSTOLIC BLOOD PRESSURE: 110 MMHG | WEIGHT: 144.13 LBS | HEIGHT: 67 IN

## 2021-09-28 DIAGNOSIS — Z31.69 ENCOUNTER FOR OTHER GENERAL COUNSELING AND ADVICE ON PROCREATION: ICD-10-CM

## 2021-09-28 DIAGNOSIS — Z01.419 ENCOUNTER FOR GYNECOLOGICAL EXAMINATION (GENERAL) (ROUTINE) W/OUT ABNORMAL FINDINGS: ICD-10-CM

## 2021-09-28 PROCEDURE — 99395 PREV VISIT EST AGE 18-39: CPT

## 2021-09-28 RX ORDER — ETONOGESTREL AND ETHINYL ESTRADIOL 11.7; 2.7 MG/1; MG/1
0.12-0.015 INSERT, EXTENDED RELEASE VAGINAL
Qty: 3 | Refills: 3 | Status: ACTIVE | COMMUNITY
Start: 2021-09-28 | End: 1900-01-01

## 2021-09-30 LAB — HPV HIGH+LOW RISK DNA PNL CVX: NOT DETECTED

## 2021-10-02 LAB — CYTOLOGY CVX/VAG DOC THIN PREP: NORMAL

## 2021-10-08 ENCOUNTER — APPOINTMENT (OUTPATIENT)
Dept: MAMMOGRAPHY | Facility: IMAGING CENTER | Age: 36
End: 2021-10-08

## 2021-10-18 ENCOUNTER — TRANSCRIPTION ENCOUNTER (OUTPATIENT)
Age: 36
End: 2021-10-18

## 2021-10-18 LAB — ANTI-MUELLERIAN HORMONE: 0.36 NG/ML

## 2022-05-31 ENCOUNTER — NON-APPOINTMENT (OUTPATIENT)
Age: 37
End: 2022-05-31

## 2022-07-20 NOTE — ED ADULT NURSE NOTE - CAS DISCH TRANSFER METHOD
Problem: Discharge Planning  Goal: Discharge to home or other facility with appropriate resources  7/20/2022 0155 by Chester Romo  Outcome: Progressing  Flowsheets (Taken 7/19/2022 2000)  Discharge to home or other facility with appropriate resources:   Identify barriers to discharge with patient and caregiver   Arrange for needed discharge resources and transportation as appropriate   Identify discharge learning needs (meds, wound care, etc)   Refer to discharge planning if patient needs post-hospital services based on physician order or complex needs related to functional status, cognitive ability or social support system  7/19/2022 1810 by Daquan De La Cruz RN  Outcome: Progressing     Problem: Safety - Adult  Goal: Free from fall injury  7/20/2022 0155 by Chester Romo  Outcome: Progressing  Flowsheets (Taken 7/19/2022 2000)  Free From Fall Injury: Instruct family/caregiver on patient safety  7/19/2022 1810 by Daquan De La Cruz RN  Outcome: Progressing     Problem: Chronic Conditions and Co-morbidities  Goal: Patient's chronic conditions and co-morbidity symptoms are monitored and maintained or improved  7/20/2022 0155 by Chesetr Romo  Outcome: Progressing  Flowsheets (Taken 7/19/2022 2000)  Care Plan - Patient's Chronic Conditions and Co-Morbidity Symptoms are Monitored and Maintained or Improved:   Monitor and assess patient's chronic conditions and comorbid symptoms for stability, deterioration, or improvement   Collaborate with multidisciplinary team to address chronic and comorbid conditions and prevent exacerbation or deterioration   Update acute care plan with appropriate goals if chronic or comorbid symptoms are exacerbated and prevent overall improvement and discharge  7/19/2022 1810 by Daquan De La Cruz RN  Outcome: Progressing     Problem: Skin/Tissue Integrity  Goal: Absence of new skin breakdown  Description: 1. Monitor for areas of redness and/or skin breakdown  2.   Assess vascular access sites hourly  3. Every 4-6 hours minimum:  Change oxygen saturation probe site  4. Every 4-6 hours:  If on nasal continuous positive airway pressure, respiratory therapy assess nares and determine need for appliance change or resting period.   7/20/2022 0155 by Pearl Rodriguez  Outcome: Progressing  7/19/2022 1810 by Jimenez Morataya RN  Outcome: Progressing     Problem: Respiratory - Adult  Goal: Achieves optimal ventilation and oxygenation  7/20/2022 0155 by Pearl Rodriguez  Outcome: Progressing  7/19/2022 1810 by Jimenez Morataya RN  Outcome: Progressing     Problem: Pain  Goal: Verbalizes/displays adequate comfort level or baseline comfort level  Outcome: Progressing  Flowsheets (Taken 7/20/2022 0000)  Verbalizes/displays adequate comfort level or baseline comfort level:   Encourage patient to monitor pain and request assistance   Assess pain using appropriate pain scale   Administer analgesics based on type and severity of pain and evaluate response   Implement non-pharmacological measures as appropriate and evaluate response     Problem: ABCDS Injury Assessment  Goal: Absence of physical injury  Outcome: Progressing Private car

## 2022-10-04 ENCOUNTER — APPOINTMENT (OUTPATIENT)
Dept: OBGYN | Facility: CLINIC | Age: 37
End: 2022-10-04

## 2022-10-04 VITALS
HEIGHT: 67 IN | DIASTOLIC BLOOD PRESSURE: 60 MMHG | SYSTOLIC BLOOD PRESSURE: 110 MMHG | BODY MASS INDEX: 23.09 KG/M2 | WEIGHT: 147.13 LBS

## 2022-10-04 DIAGNOSIS — Z32.01 ENCOUNTER FOR PREGNANCY TEST, RESULT POSITIVE: ICD-10-CM

## 2022-10-04 DIAGNOSIS — Z01.419 ENCOUNTER FOR GYNECOLOGICAL EXAMINATION (GENERAL) (ROUTINE) W/OUT ABNORMAL FINDINGS: ICD-10-CM

## 2022-10-04 PROCEDURE — 99214 OFFICE O/P EST MOD 30 MIN: CPT

## 2022-10-20 ENCOUNTER — APPOINTMENT (OUTPATIENT)
Dept: OBGYN | Facility: CLINIC | Age: 37
End: 2022-10-20

## 2022-10-20 DIAGNOSIS — Z13.79 ENCOUNTER FOR OTHER SCREENING FOR GENETIC AND CHROMOSOMAL ANOMALIES: ICD-10-CM

## 2022-10-20 DIAGNOSIS — Z34.91 ENCOUNTER FOR SUPERVISION OF NORMAL PREGNANCY, UNSPECIFIED, FIRST TRIMESTER: ICD-10-CM

## 2022-10-20 LAB
BASOPHILS # BLD AUTO: 0.04 K/UL
BASOPHILS NFR BLD AUTO: 0.5 %
EOSINOPHIL # BLD AUTO: 0.04 K/UL
EOSINOPHIL NFR BLD AUTO: 0.5 %
HCT VFR BLD CALC: 39.1 %
HGB BLD-MCNC: 12.9 G/DL
HIV1+2 AB SPEC QL IA.RAPID: NONREACTIVE
IMM GRANULOCYTES NFR BLD AUTO: 0.2 %
LYMPHOCYTES # BLD AUTO: 1.79 K/UL
LYMPHOCYTES NFR BLD AUTO: 21.8 %
MAN DIFF?: NORMAL
MCHC RBC-ENTMCNC: 31.4 PG
MCHC RBC-ENTMCNC: 33 GM/DL
MCV RBC AUTO: 95.1 FL
MONOCYTES # BLD AUTO: 0.45 K/UL
MONOCYTES NFR BLD AUTO: 5.5 %
NEUTROPHILS # BLD AUTO: 5.86 K/UL
NEUTROPHILS NFR BLD AUTO: 71.5 %
PLATELET # BLD AUTO: 258 K/UL
RBC # BLD: 4.11 M/UL
RBC # FLD: 12 %
WBC # FLD AUTO: 8.2 K/UL

## 2022-10-20 PROCEDURE — 76817 TRANSVAGINAL US OBSTETRIC: CPT

## 2022-10-20 PROCEDURE — 0501F PRENATAL FLOW SHEET: CPT

## 2022-10-21 LAB
CMV IGG SERPL QL: <0.2 U/ML
CMV IGG SERPL-IMP: NEGATIVE
CMV IGM SERPL QL: <8 AU/ML
CMV IGM SERPL QL: NEGATIVE
MEV IGG FLD QL IA: 219 AU/ML
MEV IGG+IGM SER-IMP: POSITIVE
RUBV IGG FLD-ACNC: 11.4 INDEX
RUBV IGG SER-IMP: POSITIVE
T PALLIDUM AB SER QL IA: NEGATIVE
TSH SERPL-ACNC: 1.02 UIU/ML
VZV AB TITR SER: POSITIVE
VZV IGG SER IF-ACNC: 1582 INDEX

## 2022-10-23 LAB
ABO + RH PNL BLD: NORMAL
BACTERIA UR CULT: NORMAL
BLD GP AB SCN SERPL QL: NORMAL
C TRACH RRNA SPEC QL NAA+PROBE: NOT DETECTED
HBV SURFACE AG SER QL: NONREACTIVE
HCV AB SER QL: NONREACTIVE
HCV S/CO RATIO: 0.15 S/CO
HGB A MFR BLD: 97 %
HGB A2 MFR BLD: 2.7 %
HGB F MFR BLD: 0.3 %
HGB FRACT BLD-IMP: NORMAL
LEAD BLD-MCNC: <1 UG/DL
N GONORRHOEA RRNA SPEC QL NAA+PROBE: NOT DETECTED
SOURCE AMPLIFICATION: NORMAL

## 2022-10-26 LAB — FMR1 GENE MUT ANL BLD/T: NORMAL

## 2022-10-27 ENCOUNTER — TRANSCRIPTION ENCOUNTER (OUTPATIENT)
Age: 37
End: 2022-10-27

## 2022-10-27 LAB
AR GENE MUT ANL BLD/T: NORMAL
B19V IGG SER QL IA: 0.82 INDEX
B19V IGG+IGM SER-IMP: NEGATIVE
B19V IGG+IGM SER-IMP: NORMAL
B19V IGM FLD-ACNC: 0.14 INDEX
B19V IGM SER-ACNC: NEGATIVE
CFTR MUT TESTED BLD/T: NEGATIVE
CLARI ADDITIONAL INFO: NORMAL
CLARI CHROMOSOME 13: NORMAL
CLARI CHROMOSOME 18: NORMAL
CLARI CHROMOSOME 21: NORMAL
CLARI SEX CHROMOSOMES: NORMAL
CLARI TEST COMMENT: NORMAL
CLARITEST NIPT: NORMAL
FETAL FRACT: NORMAL
GESTATION AGE: NORMAL
MATERNAL WEIGHT (LBS):: NORMAL
PLEASE INCLUDE GENDER RESULTS ON THIS REPORT:: NORMAL
TYPE OF PREGNANCY:: NORMAL

## 2022-10-30 ENCOUNTER — NON-APPOINTMENT (OUTPATIENT)
Age: 37
End: 2022-10-30

## 2022-11-01 ENCOUNTER — ASOB RESULT (OUTPATIENT)
Age: 37
End: 2022-11-01

## 2022-11-01 ENCOUNTER — APPOINTMENT (OUTPATIENT)
Dept: ANTEPARTUM | Facility: CLINIC | Age: 37
End: 2022-11-01

## 2022-11-01 PROCEDURE — 76801 OB US < 14 WKS SINGLE FETUS: CPT

## 2022-11-01 PROCEDURE — 76813 OB US NUCHAL MEAS 1 GEST: CPT | Mod: 59

## 2022-11-04 LAB — Lab: NORMAL

## 2022-11-17 ENCOUNTER — NON-APPOINTMENT (OUTPATIENT)
Age: 37
End: 2022-11-17

## 2022-11-17 ENCOUNTER — APPOINTMENT (OUTPATIENT)
Dept: OBGYN | Facility: CLINIC | Age: 37
End: 2022-11-17

## 2022-11-17 VITALS
SYSTOLIC BLOOD PRESSURE: 116 MMHG | DIASTOLIC BLOOD PRESSURE: 73 MMHG | BODY MASS INDEX: 23.86 KG/M2 | HEIGHT: 67 IN | WEIGHT: 152 LBS

## 2022-11-17 PROCEDURE — 0502F SUBSEQUENT PRENATAL CARE: CPT

## 2022-11-18 ENCOUNTER — APPOINTMENT (OUTPATIENT)
Dept: OBGYN | Facility: CLINIC | Age: 37
End: 2022-11-18

## 2022-12-06 ENCOUNTER — NON-APPOINTMENT (OUTPATIENT)
Age: 37
End: 2022-12-06

## 2022-12-14 ENCOUNTER — NON-APPOINTMENT (OUTPATIENT)
Age: 37
End: 2022-12-14

## 2022-12-16 ENCOUNTER — APPOINTMENT (OUTPATIENT)
Dept: OBGYN | Facility: CLINIC | Age: 37
End: 2022-12-16

## 2022-12-16 VITALS
SYSTOLIC BLOOD PRESSURE: 113 MMHG | DIASTOLIC BLOOD PRESSURE: 74 MMHG | HEIGHT: 67 IN | WEIGHT: 148 LBS | BODY MASS INDEX: 23.23 KG/M2

## 2022-12-16 DIAGNOSIS — Z34.02 ENCOUNTER FOR SUPERVISION OF NORMAL FIRST PREGNANCY, SECOND TRIMESTER: ICD-10-CM

## 2022-12-16 PROCEDURE — 0502F SUBSEQUENT PRENATAL CARE: CPT

## 2022-12-20 LAB
AFP MOM: 0.94
AFP VALUE: 43.7 NG/ML
ALPHA FETOPROTEIN SERUM COMMENT: NORMAL
ALPHA FETOPROTEIN SERUM INTERPRETATION: NORMAL
ALPHA FETOPROTEIN SERUM RESULTS: NORMAL
ALPHA FETOPROTEIN SERUM TEST RESULTS: NORMAL
GESTATIONAL AGE BASED ON: NORMAL
GESTATIONAL AGE ON COLLECTION DATE: 18.1 WEEKS
INSULIN DEP DIABETES: NO
MATERNAL AGE AT EDD AFP: 36.6 YR
MULTIPLE GESTATION: NO
OSBR RISK 1 IN: NORMAL
RACE: NORMAL
WEIGHT AFP: 148 LBS

## 2022-12-29 ENCOUNTER — APPOINTMENT (OUTPATIENT)
Dept: ANTEPARTUM | Facility: CLINIC | Age: 37
End: 2022-12-29

## 2022-12-29 ENCOUNTER — ASOB RESULT (OUTPATIENT)
Age: 37
End: 2022-12-29

## 2022-12-29 PROCEDURE — 76811 OB US DETAILED SNGL FETUS: CPT

## 2023-01-17 ENCOUNTER — APPOINTMENT (OUTPATIENT)
Dept: OBGYN | Facility: CLINIC | Age: 38
End: 2023-01-17
Payer: COMMERCIAL

## 2023-01-17 VITALS — SYSTOLIC BLOOD PRESSURE: 98 MMHG | WEIGHT: 157 LBS | BODY MASS INDEX: 24.59 KG/M2 | DIASTOLIC BLOOD PRESSURE: 50 MMHG

## 2023-01-17 DIAGNOSIS — Z34.92 ENCOUNTER FOR SUPERVISION OF NORMAL PREGNANCY, UNSPECIFIED, SECOND TRIMESTER: ICD-10-CM

## 2023-01-17 DIAGNOSIS — O44.40 LOW LYING PLACENTA NOS OR WITHOUT HEMORRHAGE, UNSPECIFIED TRIMESTER: ICD-10-CM

## 2023-01-17 PROCEDURE — 0502F SUBSEQUENT PRENATAL CARE: CPT

## 2023-02-13 ENCOUNTER — APPOINTMENT (OUTPATIENT)
Dept: OBGYN | Facility: CLINIC | Age: 38
End: 2023-02-13
Payer: COMMERCIAL

## 2023-02-13 VITALS
SYSTOLIC BLOOD PRESSURE: 122 MMHG | DIASTOLIC BLOOD PRESSURE: 68 MMHG | WEIGHT: 163 LBS | HEIGHT: 67 IN | BODY MASS INDEX: 25.58 KG/M2

## 2023-02-13 PROCEDURE — 0502F SUBSEQUENT PRENATAL CARE: CPT

## 2023-02-19 ENCOUNTER — TRANSCRIPTION ENCOUNTER (OUTPATIENT)
Age: 38
End: 2023-02-19

## 2023-02-19 LAB
BASOPHILS # BLD AUTO: 0.04 K/UL
BASOPHILS NFR BLD AUTO: 0.4 %
EOSINOPHIL # BLD AUTO: 0.05 K/UL
EOSINOPHIL NFR BLD AUTO: 0.5 %
GLUCOSE 1H P 50 G GLC PO SERPL-MCNC: 90 MG/DL
HCT VFR BLD CALC: 35.8 %
HGB BLD-MCNC: 11.8 G/DL
IMM GRANULOCYTES NFR BLD AUTO: 0.6 %
LYMPHOCYTES # BLD AUTO: 1.95 K/UL
LYMPHOCYTES NFR BLD AUTO: 18.4 %
MAN DIFF?: NORMAL
MCHC RBC-ENTMCNC: 32 PG
MCHC RBC-ENTMCNC: 33 GM/DL
MCV RBC AUTO: 97 FL
MONOCYTES # BLD AUTO: 0.76 K/UL
MONOCYTES NFR BLD AUTO: 7.2 %
NEUTROPHILS # BLD AUTO: 7.72 K/UL
NEUTROPHILS NFR BLD AUTO: 72.9 %
PLATELET # BLD AUTO: 306 K/UL
RBC # BLD: 3.69 M/UL
RBC # FLD: 13 %
T PALLIDUM AB SER QL IA: NEGATIVE
WBC # FLD AUTO: 10.58 K/UL

## 2023-02-23 ENCOUNTER — ASOB RESULT (OUTPATIENT)
Age: 38
End: 2023-02-23

## 2023-02-23 ENCOUNTER — APPOINTMENT (OUTPATIENT)
Dept: ANTEPARTUM | Facility: CLINIC | Age: 38
End: 2023-02-23
Payer: COMMERCIAL

## 2023-02-23 PROCEDURE — 76817 TRANSVAGINAL US OBSTETRIC: CPT

## 2023-02-23 PROCEDURE — 76816 OB US FOLLOW-UP PER FETUS: CPT

## 2023-02-27 ENCOUNTER — NON-APPOINTMENT (OUTPATIENT)
Age: 38
End: 2023-02-27

## 2023-03-14 ENCOUNTER — NON-APPOINTMENT (OUTPATIENT)
Age: 38
End: 2023-03-14

## 2023-03-23 ENCOUNTER — APPOINTMENT (OUTPATIENT)
Dept: ANTEPARTUM | Facility: CLINIC | Age: 38
End: 2023-03-23
Payer: COMMERCIAL

## 2023-03-23 ENCOUNTER — APPOINTMENT (OUTPATIENT)
Dept: OBGYN | Facility: CLINIC | Age: 38
End: 2023-03-23
Payer: COMMERCIAL

## 2023-03-23 ENCOUNTER — ASOB RESULT (OUTPATIENT)
Age: 38
End: 2023-03-23

## 2023-03-23 VITALS
WEIGHT: 169 LBS | HEIGHT: 67 IN | SYSTOLIC BLOOD PRESSURE: 117 MMHG | DIASTOLIC BLOOD PRESSURE: 76 MMHG | BODY MASS INDEX: 26.53 KG/M2

## 2023-03-23 PROCEDURE — 76816 OB US FOLLOW-UP PER FETUS: CPT

## 2023-03-23 PROCEDURE — 0502F SUBSEQUENT PRENATAL CARE: CPT

## 2023-03-23 PROCEDURE — 90471 IMMUNIZATION ADMIN: CPT

## 2023-03-23 PROCEDURE — 90715 TDAP VACCINE 7 YRS/> IM: CPT

## 2023-03-23 PROCEDURE — 76817 TRANSVAGINAL US OBSTETRIC: CPT

## 2023-04-06 ENCOUNTER — APPOINTMENT (OUTPATIENT)
Dept: OBGYN | Facility: CLINIC | Age: 38
End: 2023-04-06
Payer: COMMERCIAL

## 2023-04-06 ENCOUNTER — NON-APPOINTMENT (OUTPATIENT)
Age: 38
End: 2023-04-06

## 2023-04-06 VITALS
WEIGHT: 169 LBS | DIASTOLIC BLOOD PRESSURE: 78 MMHG | BODY MASS INDEX: 26.53 KG/M2 | SYSTOLIC BLOOD PRESSURE: 120 MMHG | HEIGHT: 67 IN

## 2023-04-06 PROCEDURE — 0502F SUBSEQUENT PRENATAL CARE: CPT

## 2023-04-20 ENCOUNTER — APPOINTMENT (OUTPATIENT)
Dept: OBGYN | Facility: CLINIC | Age: 38
End: 2023-04-20
Payer: COMMERCIAL

## 2023-04-20 ENCOUNTER — APPOINTMENT (OUTPATIENT)
Dept: ANTEPARTUM | Facility: CLINIC | Age: 38
End: 2023-04-20
Payer: COMMERCIAL

## 2023-04-20 ENCOUNTER — ASOB RESULT (OUTPATIENT)
Age: 38
End: 2023-04-20

## 2023-04-20 ENCOUNTER — NON-APPOINTMENT (OUTPATIENT)
Age: 38
End: 2023-04-20

## 2023-04-20 VITALS
BODY MASS INDEX: 27.37 KG/M2 | SYSTOLIC BLOOD PRESSURE: 111 MMHG | HEIGHT: 67 IN | DIASTOLIC BLOOD PRESSURE: 72 MMHG | WEIGHT: 174.38 LBS

## 2023-04-20 PROCEDURE — 76816 OB US FOLLOW-UP PER FETUS: CPT

## 2023-04-20 PROCEDURE — 0502F SUBSEQUENT PRENATAL CARE: CPT

## 2023-04-22 ENCOUNTER — NON-APPOINTMENT (OUTPATIENT)
Age: 38
End: 2023-04-22

## 2023-04-22 LAB
GP B STREP DNA SPEC QL NAA+PROBE: NOT DETECTED
SOURCE GBS: NORMAL

## 2023-04-24 ENCOUNTER — NON-APPOINTMENT (OUTPATIENT)
Age: 38
End: 2023-04-24

## 2023-04-24 ENCOUNTER — INPATIENT (INPATIENT)
Facility: HOSPITAL | Age: 38
LOS: 4 days | Discharge: HOME | End: 2023-04-29
Attending: OBSTETRICS & GYNECOLOGY | Admitting: OBSTETRICS & GYNECOLOGY
Payer: COMMERCIAL

## 2023-04-24 VITALS — SYSTOLIC BLOOD PRESSURE: 109 MMHG | DIASTOLIC BLOOD PRESSURE: 57 MMHG | HEART RATE: 86 BPM | TEMPERATURE: 99 F

## 2023-04-24 DIAGNOSIS — O26.899 OTHER SPECIFIED PREGNANCY RELATED CONDITIONS, UNSPECIFIED TRIMESTER: ICD-10-CM

## 2023-04-24 DIAGNOSIS — Z34.80 ENCOUNTER FOR SUPERVISION OF OTHER NORMAL PREGNANCY, UNSPECIFIED TRIMESTER: ICD-10-CM

## 2023-04-24 LAB
APTT BLD: 25.3 SEC — LOW (ref 27.5–35.5)
BASOPHILS # BLD AUTO: 0.03 K/UL — SIGNIFICANT CHANGE UP (ref 0–0.2)
BASOPHILS NFR BLD AUTO: 0.3 % — SIGNIFICANT CHANGE UP (ref 0–2)
EOSINOPHIL # BLD AUTO: 0.04 K/UL — SIGNIFICANT CHANGE UP (ref 0–0.5)
EOSINOPHIL NFR BLD AUTO: 0.4 % — SIGNIFICANT CHANGE UP (ref 0–6)
FIBRINOGEN PPP-MCNC: 421 MG/DL — SIGNIFICANT CHANGE UP (ref 200–445)
HCT VFR BLD CALC: 35 % — SIGNIFICANT CHANGE UP (ref 34.5–45)
HGB BLD-MCNC: 12.1 G/DL — SIGNIFICANT CHANGE UP (ref 11.5–15.5)
IMM GRANULOCYTES NFR BLD AUTO: 0.8 % — SIGNIFICANT CHANGE UP (ref 0–0.9)
INR BLD: 0.96 RATIO — SIGNIFICANT CHANGE UP (ref 0.88–1.16)
LYMPHOCYTES # BLD AUTO: 1.88 K/UL — SIGNIFICANT CHANGE UP (ref 1–3.3)
LYMPHOCYTES # BLD AUTO: 16.9 % — SIGNIFICANT CHANGE UP (ref 13–44)
MCHC RBC-ENTMCNC: 32.3 PG — SIGNIFICANT CHANGE UP (ref 27–34)
MCHC RBC-ENTMCNC: 34.6 GM/DL — SIGNIFICANT CHANGE UP (ref 32–36)
MCV RBC AUTO: 93.3 FL — SIGNIFICANT CHANGE UP (ref 80–100)
MONOCYTES # BLD AUTO: 0.64 K/UL — SIGNIFICANT CHANGE UP (ref 0–0.9)
MONOCYTES NFR BLD AUTO: 5.8 % — SIGNIFICANT CHANGE UP (ref 2–14)
NEUTROPHILS # BLD AUTO: 8.45 K/UL — HIGH (ref 1.8–7.4)
NEUTROPHILS NFR BLD AUTO: 75.8 % — SIGNIFICANT CHANGE UP (ref 43–77)
NRBC # BLD: 0 /100 WBCS — SIGNIFICANT CHANGE UP (ref 0–0)
PLATELET # BLD AUTO: 216 K/UL — SIGNIFICANT CHANGE UP (ref 150–400)
PROTHROM AB SERPL-ACNC: 11 SEC — SIGNIFICANT CHANGE UP (ref 10.5–13.4)
RBC # BLD: 3.75 M/UL — LOW (ref 3.8–5.2)
RBC # FLD: 12.1 % — SIGNIFICANT CHANGE UP (ref 10.3–14.5)
WBC # BLD: 11.13 K/UL — HIGH (ref 3.8–10.5)
WBC # FLD AUTO: 11.13 K/UL — HIGH (ref 3.8–10.5)

## 2023-04-24 RX ORDER — SODIUM CHLORIDE 9 MG/ML
1000 INJECTION, SOLUTION INTRAVENOUS
Refills: 0 | Status: DISCONTINUED | OUTPATIENT
Start: 2023-04-24 | End: 2023-04-25

## 2023-04-24 RX ADMIN — SODIUM CHLORIDE 125 MILLILITER(S): 9 INJECTION, SOLUTION INTRAVENOUS at 18:55

## 2023-04-24 NOTE — PROGRESS NOTE ADULT - ASSESSMENT
A/P:  36yo  at 36w4d whose pregnancy is complicated by resolved low lying placenta and vaginal bleeding. VSS. Asxs w/ no current vaginal bleeding. Cervix friable appearing. Labs pending. Overall clinical picture at current point best explained by third trimester bleeding 2/2 friable cervix. Agree with admission and observation for now - if she continues to have vaginal bleeding or bleeding increases to be more then expected from a friable cervix would reccomened delivery. BID monitoring for now. WOuld defer late  steroids.     Patient discussed with Dr. Ari Vitale (M attending)    Romeo Diaz M.D. FACOG PGY-6  Maternal Fetal Medicine Fellow  Cell: [phone] if after 5pm or weekend ask labor and delivery for on call fellow

## 2023-04-24 NOTE — OB PROVIDER H&P - NSHPPHYSICALEXAM_GEN_ALL_CORE
Vital Signs Last 24 Hrs  T(C): 37.1 (24 Apr 2023 15:05), Max: 37.1 (24 Apr 2023 15:05)  T(F): 98.78 (24 Apr 2023 15:05), Max: 98.78 (24 Apr 2023 15:05)  HR: 93 (24 Apr 2023 15:46) (84 - 101)  BP: 109/57 (24 Apr 2023 15:05) (109/57 - 109/57)  BP(mean): --  RR: --  SpO2: 96% (24 Apr 2023 15:46) (93% - 98%)    SVE: Deferred   SSE: Friable area of cervix at 12:00 w/ more bleeding upon clearing of vaginal vault w/ gauze. No bleeding seen from cervical os.   FHT: 140/mod/(+)accels/(-)decels  [place]: Irreg  Sono: Vertex    Gen: No acute distress. Awake. Alert  Pulm: Unlabored breathing. No respiratory distress   Abd: Soft. Gravid. Non-tender throughout

## 2023-04-24 NOTE — OB RN TRIAGE NOTE - RESPIRATORY RATE (BREATHS/MIN)
TRANSFER - OUT REPORT: 
 
Verbal report given to Minal(name) on Margarette Aguilar  being transferred to 1119(unit) for routine progression of care Report consisted of patients Situation, Background, Assessment and  
Recommendations(SBAR). Information from the following report(s) Procedure Summary was reviewed with the receiving nurse. Lines:  
Peripheral IV 05/13/19 Left Wrist (Active) Site Assessment Clean, dry, & intact 5/13/2019 10:30 PM  
Phlebitis Assessment 0 5/13/2019 10:30 PM  
Infiltration Assessment 0 5/13/2019 10:30 PM  
Dressing Status Clean, dry, & intact 5/13/2019 10:30 PM  
Dressing Type Transparent;Tape 5/13/2019 10:30 PM  
Hub Color/Line Status Infusing;Blue 5/13/2019 10:30 PM  
  
 
Opportunity for questions and clarification was provided.    
 
Patient transported with: 
  
 
 
 
 
 18

## 2023-04-24 NOTE — OB PROVIDER H&P - ATTENDING COMMENTS
OBGYN Attending Note:   36 y/o  at 36 wks 4 days presents to LD c/o vaginal bleeding that started yesterday, +FM, +contractions, no LOF,   Caty had a third trimester bleed a month ago. Caty has a history of low lying placenta that resolved.   PE: Pt in NAD  Uterus: gravid, NT, ND  Pelvic SVE: bleeding according to the resident came from friable cervix not from the external os.   Pelvic exam: Cx closed/long/posterior/soft/ station -3/Vtx/I   FHR 130s, +moderate variability, + accels, no decels.   contractions irregular 3-5 minutes.   A/P: 36 y/o  at 36 wks 4 days with third trimester bleeding, possible chronic placental abruption  Admit to L/D  EFM, [place], IVF, labs  MFM consult  Official OB US in AM, bedside US for presentation  FHR Category 1  Discussed observation, consider delivery if experiences another third trimester bleed, or maternal or fetal condition changes.  Continue to monitor.  Julia Boateng MD OBGYN Attending Note:   38 y/o  at 36 wks 4 days presents to LD c/o vaginal bleeding that started yesterday, +FM, +contractions, no LOF,   Caty had a third trimester bleed a month ago. Caty has a history of low lying placenta that resolved.   PE: Pt in NAD  Uterus: gravid, NT, ND  Pelvic SVE: bleeding according to the resident came from friable cervix not from the external os.   Pelvic exam: Cx closed/long/posterior/soft/ station -3/Vtx/I   FHR 130s, +moderate variability, + accels, no decels.   contractions irregular 3-5 minutes.   A/P: 38 y/o  at 36 wks 4 days with third trimester bleeding, possible chronic placental abruption, possibl PTL  Admit to L/D  EFM, [place], IVF, labs  MFM consult  Official OB US in AM, bedside US for presentation  FHR Category 1  Discussed observation, consider delivery if experiences another third trimester bleed, or maternal or fetal condition changes.  Caty signed LD consent, patient would accept a blood transfusion if needed.   Continue to monitor.  Julia Boateng MD

## 2023-04-24 NOTE — OB PROVIDER H&P - ASSESSMENT
A/P: Pt is a 37y  @ 36.4wga presenting for x2 episodes of acute on chronic vaginal bleeding. No bleeding seen from the cervical os and upon exam, bleeding appears to be coming from friable area on the cervix. Fetal status is reassuring and abdominal exam is benign. Patient admitted for monitoring of vaginal bleeding and r/o abruptions.    #Vaginal bleeding  - Rh +  - Pad counts  - AM CBC / Coags  - MFM sono in AM  - NST BID    - #Fetal wellbeing  - GBS neg    #Maternal wellbeing  - Regular diet  - SCDs for DVT ppx  - PNV    Eielen Godinez, PGY-3   resolved  hx of COVID +1/22, covid negative on 3/12, not requiring supplemental O2  -CXR w/out infiltrates  -s/p convalescent plasma therapy on 3/11

## 2023-04-24 NOTE — OB RN PATIENT PROFILE - FALL HARM RISK - UNIVERSAL INTERVENTIONS
Bed in lowest position, wheels locked, appropriate side rails in place/Call bell, personal items and telephone in reach/Instruct patient to call for assistance before getting out of bed or chair/Non-slip footwear when patient is out of bed/[place] to call system/Physically safe environment - no spills, clutter or unnecessary equipment/Purposeful Proactive Rounding/Room/bathroom lighting operational, light cord in reach

## 2023-04-24 NOTE — PROGRESS NOTE ADULT - SUBJECTIVE AND OBJECTIVE BOX
MFM Fellow Consult Note    HPI: 38yo  at 36w4d whose pregnancy is complicated by resolved low lying placenta and vaginal bleeding at 30 weeks presents with occasional vaginal bleeding throughout this week. MFM consulted for next steps in management. In review pt states she experienced vaginal bleeding at 30 weeks that was unprovoked seen when wiping - more recently about every other day this week has experienced small episodes of vaginal bleeding that has been unprovoked today more heavy seeing clots in the toilet after voiding. Denies lof +FM reports occasional ctx.      Histories  OBHx:   G1:  FT uncomplicated  GYN: none  Med: none  Surg: none  Meds: PNV  Allergies: NKDA    Physical Exam  ICU Vital Signs Last 24 Hrs  T(C): 37.1 (2023 15:48), Max: 37.1 (2023 15:05)  T(F): 98.8 (2023 15:48), Max: 98.8 (2023 15:48)  HR: 86 (2023 16:51) (84 - 101)  BP: 109/57 (2023 15:48) (109/57 - 109/57)  BP(mean): --  ABP: --  ABP(mean): --  RR: 18 (2023 15:48) (18 - 18)  SpO2: 98% (2023 16:51) (93% - 99%)  Gen: well appearing  Pelvic: Reports friable cervix on exam by resident who performed exam

## 2023-04-24 NOTE — OB PROVIDER TRIAGE NOTE - NSOBPROVIDERNOTE_OBGYN_ALL_OB_FT
A/P: Pt is a 37y  @ 36.4wga presenting for x2 episodes of acute on chronic vaginal bleeding. No bleeding seen from the cervical os and upon exam, bleeding appears to be coming from friable area on the cervix. Fetal status is reassuring and abdominal exam is benign.   - Will send for abruption labs and continue to monitor     Bandar Arvizu, PGY-1  Obstetrics and Gynecology    Discussed with Dr. Boateng

## 2023-04-24 NOTE — OB PROVIDER TRIAGE NOTE - HISTORY OF PRESENT ILLNESS
HPI: Pt is a 37y  @ 36.4wga presenting for x2 episodes of VB in the setting of recurrent spotting. Says that yesterday she had x2 episodes of bright red blood when wiping and on the toilet (@ 9a and 1030p). Had brown discharge this AM. Denies any recent sexual activity or anything per vagina. Denies any hx of hemorrhoids. History notable for low-lying placenta which has since resolved as of 2023. Denies any abdominal pain or dysuria   FM (+)  LOF (-)  CTX (-)  VB (-)  GBS neg   EFW: 2932g ()    PNC complicated by: recurrent spotting and resolved low-lying placenta      OBHx:  - 2019. FT. . 7#1oz  GynHx: Denies any gynecologic issues. Denies any hx of polyps   PMHx: Denies  PSHx: Denies  Med: PNV, Baby ASA  All: NKDA  Psych: Denies hx of mental health issues  SH: Denies hx of smoking, drinking, or drug usage during the pregnancy    Vital Signs Last 24 Hrs  T(C): 37.1 (2023 15:05), Max: 37.1 (2023 15:05)  T(F): 98.78 (2023 15:05), Max: 98.78 (2023 15:05)  HR: 93 (2023 15:46) (84 - 101)  BP: 109/57 (2023 15:05) (109/57 - 109/57)  BP(mean): --  RR: --  SpO2: 96% (2023 15:46) (93% - 98%)    SVE: Deferred   SSE: Friable area of cervix at 12:00 w/ more bleeding upon clearing of vaginal vault w/ gauze. No bleeding seen from cervical os.   FHT: 140/mod/(+)accels/(-)decels  [place]: Irreg  Sono: Vertex    Gen: No acute distress. Awake. Alert  Pulm: Unlabored breathing. No respiratory distress   Abd: Soft. Gravid. Non-tender throughout

## 2023-04-24 NOTE — OB PROVIDER H&P - HISTORY OF PRESENT ILLNESS
HPI: Pt is a 37y  @ 36.4wga presenting for x2 episodes of VB in the setting of recurrent spotting. Says that yesterday she had x2 episodes of bright red blood when wiping and on the toilet (@ 9a and 1030p). Had brown discharge this AM. Denies any recent sexual activity or anything per vagina. Denies any hx of hemorrhoids. History notable for low-lying placenta which has since resolved as of 2023. Denies any abdominal pain or dysuria   FM (+)  LOF (-)  CTX (-)  VB (-)  GBS neg   EFW: 2932g ()    PNC complicated by: recurrent spotting and resolved low-lying placenta      OBHx:  - 2019. FT. . 7#1oz  GynHx: Denies any gynecologic issues. Denies any hx of polyps   PMHx: Denies  PSHx: Denies  Med: PNV, Baby ASA  All: NKDA  Psych: Denies hx of mental health issues  SH: Denies hx of smoking, drinking, or drug usage during the pregnancy

## 2023-04-25 ENCOUNTER — ASOB RESULT (OUTPATIENT)
Age: 38
End: 2023-04-25

## 2023-04-25 ENCOUNTER — APPOINTMENT (OUTPATIENT)
Dept: ANTEPARTUM | Facility: CLINIC | Age: 38
End: 2023-04-25
Payer: COMMERCIAL

## 2023-04-25 LAB
APTT BLD: 26.3 SEC — LOW (ref 27.5–35.5)
BASOPHILS # BLD AUTO: 0.03 K/UL — SIGNIFICANT CHANGE UP (ref 0–0.2)
BASOPHILS NFR BLD AUTO: 0.3 % — SIGNIFICANT CHANGE UP (ref 0–2)
COVID-19 SPIKE DOMAIN AB INTERP: POSITIVE
COVID-19 SPIKE DOMAIN ANTIBODY RESULT: >250 U/ML — HIGH
EOSINOPHIL # BLD AUTO: 0.02 K/UL — SIGNIFICANT CHANGE UP (ref 0–0.5)
EOSINOPHIL NFR BLD AUTO: 0.2 % — SIGNIFICANT CHANGE UP (ref 0–6)
FIBRINOGEN PPP-MCNC: 460 MG/DL — HIGH (ref 200–445)
HCT VFR BLD CALC: 34.4 % — LOW (ref 34.5–45)
HGB BLD-MCNC: 11.8 G/DL — SIGNIFICANT CHANGE UP (ref 11.5–15.5)
IMM GRANULOCYTES NFR BLD AUTO: 0.7 % — SIGNIFICANT CHANGE UP (ref 0–0.9)
INR BLD: 0.97 RATIO — SIGNIFICANT CHANGE UP (ref 0.88–1.16)
LYMPHOCYTES # BLD AUTO: 1.42 K/UL — SIGNIFICANT CHANGE UP (ref 1–3.3)
LYMPHOCYTES # BLD AUTO: 14.9 % — SIGNIFICANT CHANGE UP (ref 13–44)
MCHC RBC-ENTMCNC: 31.8 PG — SIGNIFICANT CHANGE UP (ref 27–34)
MCHC RBC-ENTMCNC: 34.3 GM/DL — SIGNIFICANT CHANGE UP (ref 32–36)
MCV RBC AUTO: 92.7 FL — SIGNIFICANT CHANGE UP (ref 80–100)
MONOCYTES # BLD AUTO: 0.68 K/UL — SIGNIFICANT CHANGE UP (ref 0–0.9)
MONOCYTES NFR BLD AUTO: 7.1 % — SIGNIFICANT CHANGE UP (ref 2–14)
NEUTROPHILS # BLD AUTO: 7.32 K/UL — SIGNIFICANT CHANGE UP (ref 1.8–7.4)
NEUTROPHILS NFR BLD AUTO: 76.8 % — SIGNIFICANT CHANGE UP (ref 43–77)
NRBC # BLD: 0 /100 WBCS — SIGNIFICANT CHANGE UP (ref 0–0)
PLATELET # BLD AUTO: 219 K/UL — SIGNIFICANT CHANGE UP (ref 150–400)
PROTHROM AB SERPL-ACNC: 11.3 SEC — SIGNIFICANT CHANGE UP (ref 10.5–13.4)
RBC # BLD: 3.71 M/UL — LOW (ref 3.8–5.2)
RBC # FLD: 12.3 % — SIGNIFICANT CHANGE UP (ref 10.3–14.5)
SARS-COV-2 IGG+IGM SERPL QL IA: >250 U/ML — HIGH
SARS-COV-2 IGG+IGM SERPL QL IA: POSITIVE
T PALLIDUM AB TITR SER: NEGATIVE — SIGNIFICANT CHANGE UP
WBC # BLD: 9.54 K/UL — SIGNIFICANT CHANGE UP (ref 3.8–10.5)
WBC # FLD AUTO: 9.54 K/UL — SIGNIFICANT CHANGE UP (ref 3.8–10.5)

## 2023-04-25 PROCEDURE — 99221 1ST HOSP IP/OBS SF/LOW 40: CPT

## 2023-04-25 PROCEDURE — 76818 FETAL BIOPHYS PROFILE W/NST: CPT | Mod: 26

## 2023-04-25 RX ORDER — ASPIRIN/CALCIUM CARB/MAGNESIUM 324 MG
81 TABLET ORAL DAILY
Refills: 0 | Status: DISCONTINUED | OUTPATIENT
Start: 2023-04-25 | End: 2023-04-28

## 2023-04-25 RX ORDER — METRONIDAZOLE 7.5 MG/G
1 GEL VAGINAL DAILY
Refills: 0 | Status: DISCONTINUED | OUTPATIENT
Start: 2023-04-25 | End: 2023-04-28

## 2023-04-25 RX ADMIN — Medication 1 TABLET(S): at 16:13

## 2023-04-25 RX ADMIN — SODIUM CHLORIDE 125 MILLILITER(S): 9 INJECTION, SOLUTION INTRAVENOUS at 00:42

## 2023-04-25 RX ADMIN — METRONIDAZOLE 1 APPLICATORFUL: 7.5 GEL VAGINAL at 20:36

## 2023-04-25 RX ADMIN — Medication 81 MILLIGRAM(S): at 16:13

## 2023-04-25 NOTE — PROGRESS NOTE ADULT - ASSESSMENT
A/P: Pt is a 37y  HD#2 @ 36w5d presenting for x2 episodes of acute on chronic vaginal bleeding. No bleeding seen from the cervical os and upon exam, bleeding appears to be coming from friable area on the cervix. Fetal status is reassuring and abdominal exam is benign. Patient admitted for monitoring of vaginal bleeding and r/o abruptions.    #Vaginal bleeding  - Rh +  - Pad counts  - AM CBC / Coags  - MFM sono today  - NST BID    - #Fetal wellbeing  - GBS neg    #Maternal wellbeing  - Regular diet  - SCDs for DVT ppx  - PNV    Eileen Godinez, PGY-3 A/P: Pt is a 37y  HD#2 @ 36w5d presenting for x2 episodes of acute on chronic vaginal bleeding. No bleeding seen from the cervical os and upon exam, bleeding appears to be coming from friable area on the cervix. Fetal status is reassuring and abdominal exam is benign. Patient admitted for monitoring of vaginal bleeding and r/o abruptions. Overnight pt stable with no s/s of abruption.    #Vaginal bleeding  - Rh +  - Pad counts  - AM CBC / Coags  - MFM sono today  - Continuous monitoring     - #Fetal wellbeing  - GBS neg    #Maternal wellbeing  - Regular diet  - SCDs for DVT ppx  - PNV    Eileen Godinez, PGY-3

## 2023-04-25 NOTE — PROGRESS NOTE ADULT - ASSESSMENT
A/P: 38yo  at 36w5d preg c/b resolved low lying placenta is admitted in the setting of third trimester bleeding. VSS. Asxs. Exam c/w bleeding from cervical os likely representing chronic abruption. Exam also with findings concerning for BV -> swab sent. Recommend continued inhouse observation, empiric treatment for BV, and delivery at 37 weeks unless she has another significant episode of vaginal bleeding.     Patient seen with Dr. Ari Vitale (M attending)    Romeo Diaz M.D. FACOG PGY-6  Maternal Fetal Medicine Fellow  Cell: [phone] if after 5pm or weekend ask labor and delivery for on call fellow

## 2023-04-25 NOTE — PROGRESS NOTE ADULT - SUBJECTIVE AND OBJECTIVE BOX
R3 Antepartum Note,       Patient seen and examined at bedside, no acute overnight events. No acute complaints. Pt reports +FM, denies LOF, VB, ctx, HA, epigastric pain, blurred vision, CP, SOB, N/V, fevers, and chills.    Vital Signs Last 24 Hours  T(C): 36.7 (04-25-23 @ 04:35), Max: 37.1 (04-24-23 @ 15:05)  HR: 60 (04-25-23 @ 06:14) (52 - 101)  BP: 116/61 (04-25-23 @ 04:35) (109/57 - 136/89)  RR: 18 (04-25-23 @ 04:35) (18 - 18)  SpO2: 96% (04-25-23 @ 06:14) (91% - 99%)    CAPILLARY BLOOD GLUCOSE          Physical Exam:  General: NAD  Abdomen: Soft, non-tender, gravid  Ext: No pain or swelling    Labs:             12.1   11.13 )-----------( 216      ( 04-24 @ 16:36 )             35.0           PT/INR - ( 04-24 @ 16:36 )   PT: 11.0 sec;   INR: 0.96 ratio    PTT - ( 04-24 @ 16:36 )  PTT:25.3 sec        MEDICATIONS  (STANDING):  lactated ringers. 1000 milliLiter(s) (125 mL/Hr) IV Continuous <Continuous>    MEDICATIONS  (PRN):   R3 Antepartum Note,       Patient seen and examined at bedside, no acute overnight events. Some contractions, now resolved. No VB. Pt reports +FM, denies LOF, HA, epigastric pain, blurred vision, CP, SOB, N/V, fevers, and chills.    Vital Signs Last 24 Hours  T(C): 36.7 (04-25-23 @ 04:35), Max: 37.1 (04-24-23 @ 15:05)  HR: 60 (04-25-23 @ 06:14) (52 - 101)  BP: 116/61 (04-25-23 @ 04:35) (109/57 - 136/89)  RR: 18 (04-25-23 @ 04:35) (18 - 18)  SpO2: 96% (04-25-23 @ 06:14) (91% - 99%)    CAPILLARY BLOOD GLUCOSE          Physical Exam:  General: NAD  Abdomen: Soft, non-tender, gravid  : no bleeding on pad  Ext: No pain or swelling    Labs:             12.1   11.13 )-----------( 216      ( 04-24 @ 16:36 )             35.0           PT/INR - ( 04-24 @ 16:36 )   PT: 11.0 sec;   INR: 0.96 ratio    PTT - ( 04-24 @ 16:36 )  PTT:25.3 sec        MEDICATIONS  (STANDING):  lactated ringers. 1000 milliLiter(s) (125 mL/Hr) IV Continuous <Continuous>    MEDICATIONS  (PRN):

## 2023-04-25 NOTE — PROGRESS NOTE ADULT - SUBJECTIVE AND OBJECTIVE BOX
MFM Fellow Progress Note      S: Feeling well this morning no complaints. Denies further vaginal bleeding. Denies ctx/lof +FM    O:  ICU Vital Signs Last 24 Hrs  T(C): 36.8 (25 Apr 2023 09:31), Max: 37.1 (24 Apr 2023 15:05)  T(F): 98.24 (25 Apr 2023 09:31), Max: 98.8 (24 Apr 2023 15:48)  HR: 92 (25 Apr 2023 11:32) (52 - 105)  BP: 109/61 (25 Apr 2023 09:31) (109/57 - 136/89)  BP(mean): --  ABP: --  ABP(mean): --  RR: 18 (25 Apr 2023 06:33) (18 - 18)  SpO2: 97% (25 Apr 2023 11:32) (90% - 100%)  Gen: well appearing  Pelvic: BRB oozing from cervical os, yellow fishy smelling discharge                           12.1   11.13 )-----------( 216      ( 24 Apr 2023 16:36 )             35.0

## 2023-04-26 LAB
APTT BLD: 25.1 SEC — LOW (ref 27.5–35.5)
APTT BLD: 25.2 SEC — LOW (ref 27.5–35.5)
BASOPHILS # BLD AUTO: 0.02 K/UL — SIGNIFICANT CHANGE UP (ref 0–0.2)
BASOPHILS NFR BLD AUTO: 0.2 % — SIGNIFICANT CHANGE UP (ref 0–2)
EOSINOPHIL # BLD AUTO: 0.07 K/UL — SIGNIFICANT CHANGE UP (ref 0–0.5)
EOSINOPHIL NFR BLD AUTO: 0.8 % — SIGNIFICANT CHANGE UP (ref 0–6)
FIBRINOGEN PPP-MCNC: 403 MG/DL — SIGNIFICANT CHANGE UP (ref 200–445)
FIBRINOGEN PPP-MCNC: 485 MG/DL — HIGH (ref 200–445)
HCT VFR BLD CALC: 34.1 % — LOW (ref 34.5–45)
HCT VFR BLD CALC: 34.9 % — SIGNIFICANT CHANGE UP (ref 34.5–45)
HGB BLD-MCNC: 11.4 G/DL — LOW (ref 11.5–15.5)
HGB BLD-MCNC: 11.8 G/DL — SIGNIFICANT CHANGE UP (ref 11.5–15.5)
IMM GRANULOCYTES NFR BLD AUTO: 0.6 % — SIGNIFICANT CHANGE UP (ref 0–0.9)
INR BLD: 0.97 RATIO — SIGNIFICANT CHANGE UP (ref 0.88–1.16)
INR BLD: 0.97 RATIO — SIGNIFICANT CHANGE UP (ref 0.88–1.16)
LYMPHOCYTES # BLD AUTO: 2.07 K/UL — SIGNIFICANT CHANGE UP (ref 1–3.3)
LYMPHOCYTES # BLD AUTO: 22.3 % — SIGNIFICANT CHANGE UP (ref 13–44)
MCHC RBC-ENTMCNC: 31.6 PG — SIGNIFICANT CHANGE UP (ref 27–34)
MCHC RBC-ENTMCNC: 31.9 PG — SIGNIFICANT CHANGE UP (ref 27–34)
MCHC RBC-ENTMCNC: 33.4 GM/DL — SIGNIFICANT CHANGE UP (ref 32–36)
MCHC RBC-ENTMCNC: 33.8 GM/DL — SIGNIFICANT CHANGE UP (ref 32–36)
MCV RBC AUTO: 94.3 FL — SIGNIFICANT CHANGE UP (ref 80–100)
MCV RBC AUTO: 94.5 FL — SIGNIFICANT CHANGE UP (ref 80–100)
MONOCYTES # BLD AUTO: 0.7 K/UL — SIGNIFICANT CHANGE UP (ref 0–0.9)
MONOCYTES NFR BLD AUTO: 7.5 % — SIGNIFICANT CHANGE UP (ref 2–14)
NEUTROPHILS # BLD AUTO: 6.38 K/UL — SIGNIFICANT CHANGE UP (ref 1.8–7.4)
NEUTROPHILS NFR BLD AUTO: 68.6 % — SIGNIFICANT CHANGE UP (ref 43–77)
NRBC # BLD: 0 /100 WBCS — SIGNIFICANT CHANGE UP (ref 0–0)
NRBC # BLD: 0 /100 WBCS — SIGNIFICANT CHANGE UP (ref 0–0)
PLATELET # BLD AUTO: 201 K/UL — SIGNIFICANT CHANGE UP (ref 150–400)
PLATELET # BLD AUTO: 226 K/UL — SIGNIFICANT CHANGE UP (ref 150–400)
PROTHROM AB SERPL-ACNC: 11.2 SEC — SIGNIFICANT CHANGE UP (ref 10.5–13.4)
PROTHROM AB SERPL-ACNC: 11.2 SEC — SIGNIFICANT CHANGE UP (ref 10.5–13.4)
RBC # BLD: 3.61 M/UL — LOW (ref 3.8–5.2)
RBC # BLD: 3.7 M/UL — LOW (ref 3.8–5.2)
RBC # FLD: 12.2 % — SIGNIFICANT CHANGE UP (ref 10.3–14.5)
RBC # FLD: 12.3 % — SIGNIFICANT CHANGE UP (ref 10.3–14.5)
WBC # BLD: 7.83 K/UL — SIGNIFICANT CHANGE UP (ref 3.8–10.5)
WBC # BLD: 9.3 K/UL — SIGNIFICANT CHANGE UP (ref 3.8–10.5)
WBC # FLD AUTO: 7.83 K/UL — SIGNIFICANT CHANGE UP (ref 3.8–10.5)
WBC # FLD AUTO: 9.3 K/UL — SIGNIFICANT CHANGE UP (ref 3.8–10.5)

## 2023-04-26 RX ORDER — OXYTOCIN 10 UNIT/ML
333.33 VIAL (ML) INJECTION
Qty: 20 | Refills: 0 | Status: DISCONTINUED | OUTPATIENT
Start: 2023-04-26 | End: 2023-04-29

## 2023-04-26 RX ORDER — SODIUM CHLORIDE 9 MG/ML
1000 INJECTION, SOLUTION INTRAVENOUS
Refills: 0 | Status: DISCONTINUED | OUTPATIENT
Start: 2023-04-26 | End: 2023-04-27

## 2023-04-26 RX ORDER — CHLORHEXIDINE GLUCONATE 213 G/1000ML
1 SOLUTION TOPICAL ONCE
Refills: 0 | Status: DISCONTINUED | OUTPATIENT
Start: 2023-04-26 | End: 2023-04-27

## 2023-04-26 RX ORDER — CITRIC ACID/SODIUM CITRATE 300-500 MG
15 SOLUTION, ORAL ORAL EVERY 6 HOURS
Refills: 0 | Status: DISCONTINUED | OUTPATIENT
Start: 2023-04-26 | End: 2023-04-27

## 2023-04-26 RX ADMIN — Medication 81 MILLIGRAM(S): at 11:19

## 2023-04-26 RX ADMIN — Medication 1 TABLET(S): at 11:19

## 2023-04-26 NOTE — PROGRESS NOTE ADULT - ASSESSMENT
A/P: Pt is a 37y  HD#3 @ 36w6d presenting for x2 episodes of acute on chronic vaginal bleeding. No bleeding seen from the cervical os and upon exam, bleeding appears to be coming from friable area on the cervix. Fetal status is reassuring and abdominal exam is benign. Patient admitted for monitoring of vaginal bleeding and r/o abruptions. Overnight pt stable with no s/s of abruption.    #Vaginal bleeding  - Rh +  - Pad counts  - AM CBC / Coags  - MFM sono today  - Continuous monitoring     - #Fetal wellbeing  - GBS neg    #Maternal wellbeing  - Regular diet  - SCDs for DVT ppx  - PNV    Dispo: Continue inpatient care. IOL at 37 weeks on      CHIKI Hamilton, PGY-2   A/P: Pt is a 37y  HD#3 @ 36w6d presenting for x2 episodes of acute on chronic vaginal bleeding. No bleeding seen from the cervical os and upon exam, bleeding appears to be coming from friable area on the cervix. Fetal status is reassuring and abdominal exam is benign. Patient admitted for monitoring of vaginal bleeding and r/o abruptions. Overnight pt stable with no s/s of abruption.    #Vaginal bleeding  - Rh +  - Pad counts  - AM CBC / Coags  - MFM sono today  - Continuous monitoring     - #Fetal wellbeing  - GBS neg    #Maternal wellbeing  - Regular diet  - SCDs for DVT ppx  - PNV    Dispo: Continue inpatient care. IOL at 37 weeks on      CHIKI Hamilton, PGY-2      ATTG:  Pt seen and evaluated  Stable, now 36+6wks GA with recurrent episodes of unexplained vaginal bleeding scheduled for induction tonight and delivery tomorrow at 37wks GA  FHR and AP testing remain reassuring  For Cytotec tonight for Cervical ripening  Pt understands management plan and is excited about delivering tomorrow

## 2023-04-26 NOTE — PROGRESS NOTE ADULT - SUBJECTIVE AND OBJECTIVE BOX
R2 Antepartum Note,       Patient seen and examined at bedside, no acute overnight events. No acute complaints. Reports no episodes of vaginal bleeding since Sunday. Pt reports +FM, denies LOF, VB, ctx, HA, epigastric pain, blurred vision, CP, SOB, N/V, fevers, and chills.    Vital Signs Last 24 Hours  T(C): 36.6 (04-26-23 @ 05:23), Max: 37.1 (04-25-23 @ 21:33)  HR: 80 (04-26-23 @ 05:23) (57 - 114)  BP: 117/76 (04-26-23 @ 05:23) (103/69 - 120/74)  RR: 18 (04-26-23 @ 05:23) (18 - 18)  SpO2: 97% (04-26-23 @ 05:23) (87% - 100%)    CAPILLARY BLOOD GLUCOSE          Physical Exam:  General: NAD  Abdomen: Soft, non-tender, gravid  : no bleeding on pad   Ext: No pain or swelling    Labs:             11.8   9.54  )-----------( 219      ( 04-25 @ 14:18 )             34.4           PT/INR - ( 04-25 @ 14:18 )   PT: 11.3 sec;   INR: 0.97 ratio    PTT - ( 04-25 @ 14:18 )  PTT:26.3 sec        MEDICATIONS  (STANDING):  aspirin  chewable 81 milliGRAM(s) Oral daily  metroNIDAZOLE 0.75% Vaginal Gel 1 Applicatorful Vaginal daily  prenatal multivitamin 1 Tablet(s) Oral daily    MEDICATIONS  (PRN):

## 2023-04-27 ENCOUNTER — NON-APPOINTMENT (OUTPATIENT)
Age: 38
End: 2023-04-27

## 2023-04-27 LAB
A VAGINAE DNA VAG QL NAA+PROBE: ABNORMAL
BVAB2 DNA VAG QL NAA+PROBE: SIGNIFICANT CHANGE UP
C ALBICANS DNA VAG QL NAA+PROBE: NEGATIVE — SIGNIFICANT CHANGE UP
C GLABRATA DNA VAG QL NAA+PROBE: NEGATIVE — SIGNIFICANT CHANGE UP
C KRUSEI DNA VAG QL NAA+PROBE: NEGATIVE — SIGNIFICANT CHANGE UP
C LUSITANIAE DNA VAG QL NAA+PROBE: NEGATIVE — SIGNIFICANT CHANGE UP
C TRACH RRNA SPEC QL NAA+PROBE: NEGATIVE — SIGNIFICANT CHANGE UP
CULTURE RESULTS: SIGNIFICANT CHANGE UP
MEGA1 DNA VAG QL NAA+PROBE: ABNORMAL
N GONORRHOEA RRNA SPEC QL NAA+PROBE: NEGATIVE — SIGNIFICANT CHANGE UP
SPECIMEN SOURCE: SIGNIFICANT CHANGE UP
T VAGINALIS RRNA SPEC QL NAA+PROBE: NEGATIVE — SIGNIFICANT CHANGE UP

## 2023-04-27 PROCEDURE — 59400 OBSTETRICAL CARE: CPT | Mod: GC

## 2023-04-27 PROCEDURE — 88307 TISSUE EXAM BY PATHOLOGIST: CPT | Mod: 26

## 2023-04-27 RX ORDER — IBUPROFEN 200 MG
600 TABLET ORAL EVERY 6 HOURS
Refills: 0 | Status: DISCONTINUED | OUTPATIENT
Start: 2023-04-27 | End: 2023-04-29

## 2023-04-27 RX ORDER — BENZOCAINE 10 %
1 GEL (GRAM) MUCOUS MEMBRANE EVERY 6 HOURS
Refills: 0 | Status: DISCONTINUED | OUTPATIENT
Start: 2023-04-27 | End: 2023-04-29

## 2023-04-27 RX ORDER — TETANUS TOXOID, REDUCED DIPHTHERIA TOXOID AND ACELLULAR PERTUSSIS VACCINE, ADSORBED 5; 2.5; 8; 8; 2.5 [IU]/.5ML; [IU]/.5ML; UG/.5ML; UG/.5ML; UG/.5ML
0.5 SUSPENSION INTRAMUSCULAR ONCE
Refills: 0 | Status: DISCONTINUED | OUTPATIENT
Start: 2023-04-27 | End: 2023-04-29

## 2023-04-27 RX ORDER — ACETAMINOPHEN 500 MG
975 TABLET ORAL
Refills: 0 | Status: DISCONTINUED | OUTPATIENT
Start: 2023-04-27 | End: 2023-04-29

## 2023-04-27 RX ORDER — IBUPROFEN 200 MG
600 TABLET ORAL EVERY 6 HOURS
Refills: 0 | Status: COMPLETED | OUTPATIENT
Start: 2023-04-27 | End: 2024-03-25

## 2023-04-27 RX ORDER — PRAMOXINE HYDROCHLORIDE 150 MG/15G
1 AEROSOL, FOAM RECTAL EVERY 4 HOURS
Refills: 0 | Status: DISCONTINUED | OUTPATIENT
Start: 2023-04-27 | End: 2023-04-29

## 2023-04-27 RX ORDER — SIMETHICONE 80 MG/1
80 TABLET, CHEWABLE ORAL EVERY 4 HOURS
Refills: 0 | Status: DISCONTINUED | OUTPATIENT
Start: 2023-04-27 | End: 2023-04-29

## 2023-04-27 RX ORDER — SODIUM CHLORIDE 9 MG/ML
3 INJECTION INTRAMUSCULAR; INTRAVENOUS; SUBCUTANEOUS EVERY 8 HOURS
Refills: 0 | Status: DISCONTINUED | OUTPATIENT
Start: 2023-04-27 | End: 2023-04-29

## 2023-04-27 RX ORDER — OXYTOCIN 10 UNIT/ML
41.67 VIAL (ML) INJECTION
Qty: 20 | Refills: 0 | Status: DISCONTINUED | OUTPATIENT
Start: 2023-04-27 | End: 2023-04-29

## 2023-04-27 RX ORDER — OXYCODONE HYDROCHLORIDE 5 MG/1
5 TABLET ORAL ONCE
Refills: 0 | Status: DISCONTINUED | OUTPATIENT
Start: 2023-04-27 | End: 2023-04-29

## 2023-04-27 RX ORDER — OXYCODONE HYDROCHLORIDE 5 MG/1
5 TABLET ORAL
Refills: 0 | Status: DISCONTINUED | OUTPATIENT
Start: 2023-04-27 | End: 2023-04-29

## 2023-04-27 RX ORDER — DIPHENHYDRAMINE HCL 50 MG
25 CAPSULE ORAL EVERY 6 HOURS
Refills: 0 | Status: DISCONTINUED | OUTPATIENT
Start: 2023-04-27 | End: 2023-04-29

## 2023-04-27 RX ORDER — DIBUCAINE 1 %
1 OINTMENT (GRAM) RECTAL EVERY 6 HOURS
Refills: 0 | Status: DISCONTINUED | OUTPATIENT
Start: 2023-04-27 | End: 2023-04-29

## 2023-04-27 RX ORDER — HYDROCORTISONE 1 %
1 OINTMENT (GRAM) TOPICAL EVERY 6 HOURS
Refills: 0 | Status: DISCONTINUED | OUTPATIENT
Start: 2023-04-27 | End: 2023-04-29

## 2023-04-27 RX ORDER — KETOROLAC TROMETHAMINE 30 MG/ML
30 SYRINGE (ML) INJECTION ONCE
Refills: 0 | Status: DISCONTINUED | OUTPATIENT
Start: 2023-04-27 | End: 2023-04-27

## 2023-04-27 RX ORDER — LANOLIN
1 OINTMENT (GRAM) TOPICAL EVERY 6 HOURS
Refills: 0 | Status: DISCONTINUED | OUTPATIENT
Start: 2023-04-27 | End: 2023-04-29

## 2023-04-27 RX ORDER — MAGNESIUM HYDROXIDE 400 MG/1
30 TABLET, CHEWABLE ORAL
Refills: 0 | Status: DISCONTINUED | OUTPATIENT
Start: 2023-04-27 | End: 2023-04-29

## 2023-04-27 RX ORDER — OXYTOCIN 10 UNIT/ML
4 VIAL (ML) INJECTION
Qty: 30 | Refills: 0 | Status: DISCONTINUED | OUTPATIENT
Start: 2023-04-27 | End: 2023-04-27

## 2023-04-27 RX ORDER — AER TRAVELER 0.5 G/1
1 SOLUTION RECTAL; TOPICAL EVERY 4 HOURS
Refills: 0 | Status: DISCONTINUED | OUTPATIENT
Start: 2023-04-27 | End: 2023-04-29

## 2023-04-27 RX ADMIN — SODIUM CHLORIDE 125 MILLILITER(S): 9 INJECTION, SOLUTION INTRAVENOUS at 14:53

## 2023-04-27 RX ADMIN — Medication 30 MILLIGRAM(S): at 20:28

## 2023-04-27 RX ADMIN — Medication 125 MILLIUNIT(S)/MIN: at 21:19

## 2023-04-27 NOTE — OB PROVIDER DELIVERY SUMMARY - NSSELHIDDEN_OBGYN_ALL_OB_FT
[NS_DeliveryAttending1_OBGYN_ALL_OB_FT:[email or web address]=],[NS_DeliveryRN_OBGYN_ALL_OB_FT:[id number]=]

## 2023-04-27 NOTE — OB RN DELIVERY SUMMARY - NS_SEPSISRSKCALC_OBGYN_ALL_OB_FT
EOS calculated successfully. EOS Risk Factor: 0.5/1000 live births ([facility] national incidence); GA=37w;Temp=98.6; ROM=5.133; GBS='Negative'; Antibiotics='No antibiotics or any antibiotics < 2 hrs prior to birth'

## 2023-04-27 NOTE — OB PROVIDER LABOR PROGRESS NOTE - NS_OBIHIFHRDETAILS_OBGYN_ALL_OB_FT
Cat I
baseline 135, mod variability, + accels, no decels
baseline 135, mod variability,+ accels no decels
baseline 130, mod variability, + accels, interemittent variable decels
mod. variability, (+) 15 BPM accels
145 mod variability +variables

## 2023-04-27 NOTE — OB PROVIDER LABOR PROGRESS NOTE - ASSESSMENT
[place].  To change to Pitocin @ time of next dose of Buccal Cytotec being due.    ANDREW Morgan  D/W Dr. Schwartz
Requesting top off, now 5/80/-1 and transitioning into active labor. Pit at 4. Continue titration, reinforce w/ anesthesia. Anticipate . Intermittent variable decels but overall reassuring with moderate variability.
37yoF  @ 37w admitted for IOL 2/2 abruption.   - Continue with Pitocin   - GBS neg  - EFM/[place]: continuous monitoring   - Epidural for pain mgmt  - Resuscitative measures prn    - Anticipate   - D/w Dr. Efren Pugh, PA-C
38YO P1 undergoing induction of labor for persistent, intermittent, unexplained vaginal bleeding@37wks GA.  -Cont Buccal cytotec for cervical ripening  -Follow FHR tracing  -Epidural PRN  -Anticipate 
OB attg note  36yo P1 at 37 wk with resolved low lying placenta and several episodes of spotting/bleeding, now being induced for suspected chronic abruption. Currently no ongoing bleeding, EFM cat 1. Prior FT  .  course uncomplicated. Currently on buccal cytotec, SVE as above, plan for CB and pitocin when able. Epidural PRN. Consents in chart. GBS neg, anticipate .    Vital Signs Last 24 Hrs  T(C): 37.0 (2023 08:59), Max: 37.0 (2023 08:59)  T(F): 98.6 (2023 08:59), Max: 98.6 (2023 08:59)  HR: 70 (2023 09:45) (53 - 107)  BP: 124/73 (2023 08:59) (108/59 - 126/76)  BP(mean): --  RR: 18 (2023 08:59) (16 - 18)  SpO2: 87% (2023 09:45) (83% - 98%)    Parameters below as of 2023 21:26  Patient On (Oxygen Delivery Method): room air                            11.8   9.30  )-----------( 226      ( 2023 22:38 )             34.9     Jaya MARTIN  
P1 at 37wk for IOL for suspected chronic abruption. S/p buccal cytotec and CB, started on pitocin and now AROMed for clear fluid. Pit currently at 4, comfortable with epidural. Minimal bleeding, Cat 1 tracing. Anticipate .    Jaya MARTIN

## 2023-04-27 NOTE — OB PROVIDER DELIVERY SUMMARY - NSPROVIDERDELIVERYNOTE_OBGYN_ALL_OB_FT
Patient found to be fully dilated and pushed effectively. Head shoulders and body delivered easily. Infant handed to mother and delayed cord clamping performed. Cord clamped and cut and cord gases obtained. Gentle traction on placenta allowed for delivery of placenta, bimanual exam revealed uterus free of retained products. Gross inspection of placenta without evidence of abruption, will send to pathology. Fundus firm with bimanual massage. No lacerations noted. EBL 200cc.

## 2023-04-27 NOTE — OB PROVIDER LABOR PROGRESS NOTE - NS_SUBJECTIVE/OBJECTIVE_OBGYN_ALL_OB_FT
Pt resting comfortably  Vital Signs Last 24 Hrs  T(C): 36.5 (27 Apr 2023 04:49), Max: 36.8 (26 Apr 2023 09:05)  T(F): 97.7 (27 Apr 2023 04:49), Max: 98.3 (26 Apr 2023 17:10)  HR: 60 (27 Apr 2023 05:14) (55 - 107)  BP: 120/67 (27 Apr 2023 04:49) (108/59 - 126/76)  BP(mean): --  RR: 16 (27 Apr 2023 04:49) (16 - 18)  SpO2: 98% (27 Apr 2023 05:14) (92% - 98%)    Parameters below as of 26 Apr 2023 21:26  Patient On (Oxygen Delivery Method): room air
Pt seen and examined at bedside. Pt c/o increase rectal pressure.  VE: 9.5/100/0    Vital Signs Last 24 Hrs  T(C): 36.5 (27 Apr 2023 17:12), Max: 37.0 (27 Apr 2023 08:59)  T(F): 97.7 (27 Apr 2023 17:12), Max: 98.6 (27 Apr 2023 08:59)  HR: 88 (27 Apr 2023 18:45) (53 - 107)  BP: 131/73 (27 Apr 2023 18:43) (101/63 - 137/69)  RR: 18 (27 Apr 2023 17:12) (16 - 18)  SpO2: 87% (27 Apr 2023 18:45) (83% - 100%)
Pt seen and examined on labor floor for IOL secondary to chronic abruption. Pt is without complaints.     VE: 1.5/60/-3  EFM: indeterminate/moderate variability/+accels/-decels  TOCO: irregular    A/P: IOL 2/2 chronic abruption  - Buccal cytotec for IOL   - EFM/TOCO  - IVH  - Clear liquid diet  - Anesthesia consult   - Anticipate      LCavalieri PAC
comfortable with epidural
Pt. comfortable.
comfortable but feeling ctx
feeling more discomfort with contractions, requesting top off

## 2023-04-27 NOTE — OB RN DELIVERY SUMMARY - NSSELHIDDEN_OBGYN_ALL_OB_FT
[NS_DeliveryAttending1_OBGYN_ALL_OB_FT:[email or web address]=],[NS_DeliveryRN_OBGYN_ALL_OB_FT:[id number]=]

## 2023-04-28 ENCOUNTER — APPOINTMENT (OUTPATIENT)
Dept: OBGYN | Facility: CLINIC | Age: 38
End: 2023-04-28

## 2023-04-28 RX ADMIN — Medication 975 MILLIGRAM(S): at 05:49

## 2023-04-28 RX ADMIN — Medication 1 TABLET(S): at 11:59

## 2023-04-28 RX ADMIN — Medication 975 MILLIGRAM(S): at 11:59

## 2023-04-28 RX ADMIN — Medication 975 MILLIGRAM(S): at 06:15

## 2023-04-28 RX ADMIN — Medication 975 MILLIGRAM(S): at 23:45

## 2023-04-28 RX ADMIN — Medication 975 MILLIGRAM(S): at 18:46

## 2023-04-28 RX ADMIN — Medication 600 MILLIGRAM(S): at 09:19

## 2023-04-28 RX ADMIN — Medication 600 MILLIGRAM(S): at 15:35

## 2023-04-28 RX ADMIN — Medication 975 MILLIGRAM(S): at 18:03

## 2023-04-28 RX ADMIN — Medication 975 MILLIGRAM(S): at 12:40

## 2023-04-28 RX ADMIN — Medication 600 MILLIGRAM(S): at 22:00

## 2023-04-28 RX ADMIN — Medication 600 MILLIGRAM(S): at 14:55

## 2023-04-28 RX ADMIN — Medication 600 MILLIGRAM(S): at 01:00

## 2023-04-28 RX ADMIN — Medication 600 MILLIGRAM(S): at 21:21

## 2023-04-28 NOTE — PROGRESS NOTE ADULT - SUBJECTIVE AND OBJECTIVE BOX
OB Progress Note:  PPD#1    S: 38yo  PPD#1 s/p . Patient feels well. Pain is well controlled. She is tolerating a regular diet and passing flatus. She is voiding spontaneously, and ambulating without difficulty. Denies CP/SOB. lightheadedness/dizziness. N/V. Denies sxs of PEC: denies headache, visual disturbances, RUQ pain, respiratory distress    O:  Vitals:  Vital Signs Last 24 Hrs  T(C): 36.6 (2023 05:49), Max: 37.2 (2023 19:37)  T(F): 97.9 (2023 05:49), Max: 99 (2023 19:37)  HR: 89 (2023 05:49) (54 - 101)  BP: 106/52 (2023 05:49) (101/63 - 163/103)  BP(mean): 82 (2023 21:35) (78 - 88)  RR: 18 (2023 05:49) (16 - 18)  SpO2: 97% (2023 05:49) (83% - 100%)    Parameters below as of 2023 05:49  Patient On (Oxygen Delivery Method): room air        MEDICATIONS  (STANDING):  acetaminophen     Tablet .. 975 milliGRAM(s) Oral <User Schedule>  aspirin  chewable 81 milliGRAM(s) Oral daily  diphtheria/tetanus/pertussis (acellular) Vaccine (Adacel) 0.5 milliLiter(s) IntraMuscular once  ibuprofen  Tablet. 600 milliGRAM(s) Oral every 6 hours  metroNIDAZOLE 0.75% Vaginal Gel 1 Applicatorful Vaginal daily  oxytocin Infusion 333.333 milliUNIT(s)/Min (1000 mL/Hr) IV Continuous <Continuous>  oxytocin Infusion 41.667 milliUNIT(s)/Min (125 mL/Hr) IV Continuous <Continuous>  prenatal multivitamin 1 Tablet(s) Oral daily  prenatal multivitamin 1 Tablet(s) Oral daily  sodium chloride 0.9% lock flush 3 milliLiter(s) IV Push every 8 hours      Labs:  Blood type: A Positive  Rubella IgG: RPR: Negative                          11.8   9.30 >-----------< 226    (  @ 22:38 )             34.9                        11.4<L>   7.83 >-----------< 201    (  @ 06:35 )             34.1<L>                        11.8   9.54 >-----------< 219    (  @ 14:18 )             34.4<L>                  Physical Exam:  General: NAD  Abdomen: soft, non-tender, non-distended, fundus firm  Vaginal: Lochia wnl  Extremities: No erythema/edema

## 2023-04-28 NOTE — PROGRESS NOTE ADULT - ATTENDING COMMENTS
ERIKA attendinyo  at 36w5d preg c/b resolved low lying placenta is admitted in the setting of third trimester bleeding.  SSE repeated with small amount of bleeding noted from cervical os likely representing chronic abruption. Exam also with findings concerning for BV -> swab sent and will treat empirically. Recommend continued inhouse observation and delivery at 37 weeks unless bright red bleeding noted during admission.   NST BID    I was present and participated in all key portions of assessment and plan. Agree with fellow's note.     Dr Ari Vitale
Pt w no complaints.   +OOB    +solomon reg  VSS  Cont pp care
37y  HD#2 @ 36 wks w chronic intermittent Vaginal spotting daily for weeks, noted friable cervix on exam. PT noted slight increase in spotting yesterday and came into L&D for further eval. Pt stable  r/o abrution    ICU Vital Signs Last 24 Hrs  T(C): 36.9 (2023 07:39), Max: 37.1 (2023 15:05)  T(F): 98.42 (2023 07:39), Max: 98.8 (2023 15:48)  HR: 68 (2023 09:14) (52 - 101)  BP: 117/68 (2023 07:39) (109/57 - 136/89)  RR: 18 (2023 06:33) (18 - 18)  SpO2: 97% (2023 09:14) (90% - 100%)    O2 Parameters below as of 2023 15:48  Patient On (Oxygen Delivery Method): room air                          12.1   11.13 )-----------( 216      ( 2023 16:36 )             35.0     FHR: 125, moderate variability, + accels, noted matenal HR vs 2 decels  VE: deffered  TOCO: irreg        plan: admit for observation  labs  MFM sono   consider delivery at term if indicated by maternal fetal indication or at term if abruption highly suspected

## 2023-04-28 NOTE — PROGRESS NOTE ADULT - ASSESSMENT
A/P: 38yo PPD#1 s/p , .  Patient is stable and doing well post-partum.   - Pain well controlled, continue current pain regimen  - Increase ambulation, SCDs when not ambulating  - Continue regular diet    Zari Jamil, PGY1

## 2023-04-29 ENCOUNTER — TRANSCRIPTION ENCOUNTER (OUTPATIENT)
Age: 38
End: 2023-04-29

## 2023-04-29 VITALS
SYSTOLIC BLOOD PRESSURE: 120 MMHG | HEART RATE: 77 BPM | RESPIRATION RATE: 18 BRPM | TEMPERATURE: 98 F | OXYGEN SATURATION: 99 % | DIASTOLIC BLOOD PRESSURE: 77 MMHG

## 2023-04-29 PROCEDURE — 86900 BLOOD TYPING SEROLOGIC ABO: CPT

## 2023-04-29 PROCEDURE — 85384 FIBRINOGEN ACTIVITY: CPT

## 2023-04-29 PROCEDURE — 87070 CULTURE OTHR SPECIMN AEROBIC: CPT

## 2023-04-29 PROCEDURE — 86901 BLOOD TYPING SEROLOGIC RH(D): CPT

## 2023-04-29 PROCEDURE — 87661 TRICHOMONAS VAGINALIS AMPLIF: CPT

## 2023-04-29 PROCEDURE — 87591 N.GONORRHOEAE DNA AMP PROB: CPT

## 2023-04-29 PROCEDURE — 86769 SARS-COV-2 COVID-19 ANTIBODY: CPT

## 2023-04-29 PROCEDURE — 87801 DETECT AGNT MULT DNA AMPLI: CPT

## 2023-04-29 PROCEDURE — 85730 THROMBOPLASTIN TIME PARTIAL: CPT

## 2023-04-29 PROCEDURE — 85027 COMPLETE CBC AUTOMATED: CPT

## 2023-04-29 PROCEDURE — 59050 FETAL MONITOR W/REPORT: CPT

## 2023-04-29 PROCEDURE — 36415 COLL VENOUS BLD VENIPUNCTURE: CPT

## 2023-04-29 PROCEDURE — 87798 DETECT AGENT NOS DNA AMP: CPT

## 2023-04-29 PROCEDURE — 76818 FETAL BIOPHYS PROFILE W/NST: CPT

## 2023-04-29 PROCEDURE — 88307 TISSUE EXAM BY PATHOLOGIST: CPT

## 2023-04-29 PROCEDURE — 87491 CHLMYD TRACH DNA AMP PROBE: CPT

## 2023-04-29 PROCEDURE — 85025 COMPLETE CBC W/AUTO DIFF WBC: CPT

## 2023-04-29 PROCEDURE — 86780 TREPONEMA PALLIDUM: CPT

## 2023-04-29 PROCEDURE — 86850 RBC ANTIBODY SCREEN: CPT

## 2023-04-29 PROCEDURE — 85610 PROTHROMBIN TIME: CPT

## 2023-04-29 RX ORDER — ACETAMINOPHEN 500 MG
3 TABLET ORAL
Qty: 0 | Refills: 0 | DISCHARGE
Start: 2023-04-29

## 2023-04-29 RX ORDER — IBUPROFEN 200 MG
1 TABLET ORAL
Qty: 0 | Refills: 0 | DISCHARGE
Start: 2023-04-29

## 2023-04-29 RX ADMIN — Medication 975 MILLIGRAM(S): at 06:00

## 2023-04-29 RX ADMIN — Medication 600 MILLIGRAM(S): at 09:40

## 2023-04-29 RX ADMIN — Medication 975 MILLIGRAM(S): at 12:00

## 2023-04-29 RX ADMIN — Medication 975 MILLIGRAM(S): at 00:30

## 2023-04-29 RX ADMIN — Medication 600 MILLIGRAM(S): at 09:01

## 2023-04-29 RX ADMIN — Medication 975 MILLIGRAM(S): at 06:32

## 2023-04-29 RX ADMIN — Medication 975 MILLIGRAM(S): at 11:24

## 2023-04-29 NOTE — DISCHARGE NOTE OB - MATERIALS PROVIDED
[facility] Remlap Screening Program/Breastfeeding Log/Guide to Postpartum Care/[facility] Hearing Screen Program/Breastfeeding Guide and Packet/Birth Certificate Instructions

## 2023-04-29 NOTE — DISCHARGE NOTE OB - CARE PLAN
1 Principal Discharge DX:	Normal vaginal delivery  Assessment and plan of treatment:	return to normal activity as tolerated

## 2023-04-29 NOTE — DISCHARGE NOTE OB - PATIENT PORTAL LINK FT
You can access the FollowMyHealth Patient Portal offered by [facility] by registering at the following website: [email or web address]. By joining [facility]’s FollowMyHealth portal, you will also be able to view your health information using other applications (apps) compatible with our system.

## 2023-04-29 NOTE — DISCHARGE NOTE OB - MEDICATION SUMMARY - MEDICATIONS TO TAKE
I will START or STAY ON the medications listed below when I get home from the hospital:    ibuprofen 600 mg oral tablet  -- 1 tab(s) by mouth every 6 hours  -- Indication: For pain    acetaminophen 325 mg oral tablet  -- 3 tab(s) by mouth every 8 hours as needed for  moderate pain  -- Indication: For pain    Trinate oral tablet  -- 1 tab(s) by mouth once a day  -- Indication: For nutrition

## 2023-04-29 NOTE — PROGRESS NOTE ADULT - SUBJECTIVE AND OBJECTIVE BOX
S: Patient doing well. no complaints.  out of bed, tolerating regular diet.  breast feeding. voiding spontaneously. Minimal lochia. Pain controlled.    O: Vital Signs Last 24 Hrs  T(C): 36.6 (2023 05:56), Max: 36.8 (2023 12:39)  T(F): 97.9 (2023 05:56), Max: 98.2 (2023 12:39)  HR: 78 (2023 05:56) (76 - 86)  BP: 125/75 (2023 05:56) (117/75 - 128/79)  BP(mean): --  RR: 18 (2023 05:56) (18 - 18)  SpO2: 99% (2023 05:56) (96% - 99%)    Parameters below as of 2023 05:56  Patient On (Oxygen Delivery Method): room air        Gen: NAD  Abd: soft, nontender, nondistended, fundus firm below umbilicus  Lochia: moderate  Ext: warm, well perfused bilaterally, nontender bilaterally    Labs:      A: 38yo  PPD#2 s/p  doing well.    Plan:  1. ID: no signs/symptoms infection  2. uro: voiding spontaneously  3. heme: no signs/symptoms anemia  4. GI: tolerating regular diet  5. Neuro: po analgesia prn  6. OB: breast feeding  7. Dispo: d/c home. to f/u in office for postpartum visit in 4-6 weeks. pelvic rest until then.  to continue prenatal vitamin    all questions/concerns of pt and her partner addressed to their satisfaction    Mohini Jack MD

## 2023-04-29 NOTE — DISCHARGE NOTE OB - NS MD DC FALL RISK RISK
For information on Fall & Injury Prevention, visit: [email or web address] OR  [email or web address] OR  https://www.cdc.gov/steadi/patient.html

## 2023-04-29 NOTE — DISCHARGE NOTE OB - HOSPITAL COURSE
The patientwas  admitted 4/26/23 due to vaginal bleeding and underwent induction of labor due to presumed placental abruption.  She had an uncomplicated vaginal delivery of a viable female infant on 4/27/23.  She achieved all appropriate postpartum milestones and was discharged home in stable condition on 4/29/23.  She was instructed to follow up for postpartum visit in 4-6 weeks with pelvic rest in the interim.

## 2023-04-29 NOTE — DISCHARGE NOTE OB - CARE PROVIDER_API CALL
Julia Boateng)  Obstetrics and Gynecology  [address], [address]  [place] [address]  Phone: [phone]  Fax: [phone]  Follow Up Time:

## 2023-04-30 ENCOUNTER — NON-APPOINTMENT (OUTPATIENT)
Age: 38
End: 2023-04-30

## 2023-05-05 ENCOUNTER — NON-APPOINTMENT (OUTPATIENT)
Age: 38
End: 2023-05-05

## 2023-06-02 ENCOUNTER — NON-APPOINTMENT (OUTPATIENT)
Age: 38
End: 2023-06-02

## 2023-06-02 ENCOUNTER — TRANSCRIPTION ENCOUNTER (OUTPATIENT)
Age: 38
End: 2023-06-02

## 2023-06-05 ENCOUNTER — NON-APPOINTMENT (OUTPATIENT)
Age: 38
End: 2023-06-05

## 2023-06-08 ENCOUNTER — APPOINTMENT (OUTPATIENT)
Dept: OBGYN | Facility: CLINIC | Age: 38
End: 2023-06-08
Payer: COMMERCIAL

## 2023-06-08 VITALS
WEIGHT: 157 LBS | DIASTOLIC BLOOD PRESSURE: 70 MMHG | SYSTOLIC BLOOD PRESSURE: 100 MMHG | BODY MASS INDEX: 24.64 KG/M2 | HEIGHT: 67 IN

## 2023-06-08 PROCEDURE — 0502F SUBSEQUENT PRENATAL CARE: CPT

## 2023-06-14 ENCOUNTER — TRANSCRIPTION ENCOUNTER (OUTPATIENT)
Age: 38
End: 2023-06-14

## 2023-06-14 DIAGNOSIS — N76.0 ACUTE VAGINITIS: ICD-10-CM

## 2023-06-14 DIAGNOSIS — B96.89 ACUTE VAGINITIS: ICD-10-CM

## 2023-06-14 LAB
CANDIDA VAG CYTO: NOT DETECTED
G VAGINALIS+PREV SP MTYP VAG QL MICRO: DETECTED
T VAGINALIS VAG QL WET PREP: NOT DETECTED

## 2023-06-14 RX ORDER — METRONIDAZOLE 7.5 MG/G
0.75 GEL VAGINAL
Qty: 1 | Refills: 0 | Status: ACTIVE | COMMUNITY
Start: 2023-06-14 | End: 1900-01-01

## 2023-12-25 ENCOUNTER — NON-APPOINTMENT (OUTPATIENT)
Age: 38
End: 2023-12-25

## 2024-01-25 RX ORDER — ETONOGESTREL AND ETHINYL ESTRADIOL .12; .015 MG/D; MG/D
0.12-0.015 RING VAGINAL
Qty: 3 | Refills: 2 | Status: ACTIVE | COMMUNITY
Start: 2021-04-22 | End: 1900-01-01

## 2024-02-22 ENCOUNTER — NON-APPOINTMENT (OUTPATIENT)
Age: 39
End: 2024-02-22

## 2024-05-22 ENCOUNTER — NON-APPOINTMENT (OUTPATIENT)
Age: 39
End: 2024-05-22

## 2024-07-22 NOTE — OB RN PATIENT PROFILE - NS_PRENATALSTART_OBGYN_ALL_OB
Health Decision Maker has been checked with the patient   Primary Decision Maker: Dylan Orlando - Spouse - [phone]         Chief Complaint   Patient presents with    Tingling     Right foot and right leg tingling. Has been going on for months.       \"Have you been to the ER, urgent care clinic since your last visit?  Hospitalized since your last visit?\"    NO    “Have you seen or consulted any other health care providers outside of [facility] since your last visit?”    YES - When: approximately 1 months ago.  Where and Why: [facility]    PT SIGNED AI FORM. PT DECLINED WATER WHEN OFFERED.    Vitals:    07/22/24 1038   BP: 126/80   Pulse: 61   Resp: 15   Temp: 98 °F (36.7 °C)   SpO2: 98%                     Click Here for Release of Records Request   Started first trimester

## 2024-09-27 ENCOUNTER — APPOINTMENT (OUTPATIENT)
Dept: OBGYN | Facility: CLINIC | Age: 39
End: 2024-09-27
Payer: COMMERCIAL

## 2024-09-27 VITALS — SYSTOLIC BLOOD PRESSURE: 110 MMHG | WEIGHT: 153 LBS | DIASTOLIC BLOOD PRESSURE: 80 MMHG | BODY MASS INDEX: 23.96 KG/M2

## 2024-09-27 DIAGNOSIS — Z01.419 ENCOUNTER FOR GYNECOLOGICAL EXAMINATION (GENERAL) (ROUTINE) W/OUT ABNORMAL FINDINGS: ICD-10-CM

## 2024-09-27 DIAGNOSIS — R92.30 DENSE BREASTS, UNSPECIFIED: ICD-10-CM

## 2024-09-27 PROCEDURE — 99395 PREV VISIT EST AGE 18-39: CPT

## 2024-09-27 RX ORDER — ETONOGESTREL AND ETHINYL ESTRADIOL 11.7; 2.7 MG/1; MG/1
0.12-0.015 INSERT, EXTENDED RELEASE VAGINAL
Qty: 3 | Refills: 3 | Status: ACTIVE | COMMUNITY
Start: 2024-09-27 | End: 1900-01-01

## 2024-09-27 NOTE — DISCUSSION/SUMMARY
[FreeTextEntry1] : 40 y/o P2 LMP on nuva ring Pap smear with cotesting Screening mammogram and breast ultrasound Nuvaring prescription f/u 1 year/PRN

## 2024-09-27 NOTE — HISTORY OF PRESENT ILLNESS
[FreeTextEntry1] : 40 y/o P2 LMP on nuva ring No complaints Fhx of breast CA Paternal GM in her 60s

## 2024-09-30 LAB — HPV HIGH+LOW RISK DNA PNL CVX: NOT DETECTED

## 2024-10-05 LAB — CYTOLOGY CVX/VAG DOC THIN PREP: NORMAL

## 2025-03-13 ENCOUNTER — RESULT REVIEW (OUTPATIENT)
Age: 40
End: 2025-03-13

## 2025-03-13 ENCOUNTER — APPOINTMENT (OUTPATIENT)
Dept: ULTRASOUND IMAGING | Facility: CLINIC | Age: 40
End: 2025-03-13
Payer: COMMERCIAL

## 2025-03-13 ENCOUNTER — APPOINTMENT (OUTPATIENT)
Dept: MAMMOGRAPHY | Facility: CLINIC | Age: 40
End: 2025-03-13
Payer: COMMERCIAL

## 2025-03-13 PROCEDURE — 77067 SCR MAMMO BI INCL CAD: CPT

## 2025-03-13 PROCEDURE — 77063 BREAST TOMOSYNTHESIS BI: CPT

## 2025-03-13 PROCEDURE — 76641 ULTRASOUND BREAST COMPLETE: CPT | Mod: 50

## 2025-04-23 ENCOUNTER — NON-APPOINTMENT (OUTPATIENT)
Age: 40
End: 2025-04-23

## 2025-04-24 DIAGNOSIS — Z91.89 OTHER SPECIFIED PERSONAL RISK FACTORS, NOT ELSEWHERE CLASSIFIED: ICD-10-CM
